# Patient Record
Sex: FEMALE | Race: OTHER | URBAN - METROPOLITAN AREA
[De-identification: names, ages, dates, MRNs, and addresses within clinical notes are randomized per-mention and may not be internally consistent; named-entity substitution may affect disease eponyms.]

---

## 2021-04-13 ENCOUNTER — INPATIENT (INPATIENT)
Facility: HOSPITAL | Age: 86
LOS: 7 days | Discharge: HOSPICE | End: 2021-04-21
Attending: INTERNAL MEDICINE | Admitting: INTERNAL MEDICINE
Payer: OTHER MISCELLANEOUS

## 2021-04-13 VITALS
SYSTOLIC BLOOD PRESSURE: 190 MMHG | DIASTOLIC BLOOD PRESSURE: 108 MMHG | HEIGHT: 62 IN | WEIGHT: 179.9 LBS | HEART RATE: 120 BPM

## 2021-04-13 LAB
ALBUMIN SERPL ELPH-MCNC: 4.2 G/DL — SIGNIFICANT CHANGE UP (ref 3.5–5.2)
ALP SERPL-CCNC: 137 U/L — HIGH (ref 30–115)
ALT FLD-CCNC: 34 U/L — SIGNIFICANT CHANGE UP (ref 0–41)
ANION GAP SERPL CALC-SCNC: 15 MMOL/L — HIGH (ref 7–14)
APTT BLD: 37.5 SEC — SIGNIFICANT CHANGE UP (ref 27–39.2)
AST SERPL-CCNC: 361 U/L — HIGH (ref 0–41)
BASE EXCESS BLDV CALC-SCNC: 0.9 MMOL/L — SIGNIFICANT CHANGE UP (ref -2–2)
BASOPHILS # BLD AUTO: 0.02 K/UL — SIGNIFICANT CHANGE UP (ref 0–0.2)
BASOPHILS NFR BLD AUTO: 0.1 % — SIGNIFICANT CHANGE UP (ref 0–1)
BILIRUB SERPL-MCNC: 0.5 MG/DL — SIGNIFICANT CHANGE UP (ref 0.2–1.2)
BLD GP AB SCN SERPL QL: SIGNIFICANT CHANGE UP
BUN SERPL-MCNC: 29 MG/DL — HIGH (ref 10–20)
CALCIUM SERPL-MCNC: 9.6 MG/DL — SIGNIFICANT CHANGE UP (ref 8.5–10.1)
CHLORIDE SERPL-SCNC: 104 MMOL/L — SIGNIFICANT CHANGE UP (ref 98–110)
CO2 SERPL-SCNC: 20 MMOL/L — SIGNIFICANT CHANGE UP (ref 17–32)
CREAT SERPL-MCNC: 0.9 MG/DL — SIGNIFICANT CHANGE UP (ref 0.7–1.5)
EOSINOPHIL # BLD AUTO: 0 K/UL — SIGNIFICANT CHANGE UP (ref 0–0.7)
EOSINOPHIL NFR BLD AUTO: 0 % — SIGNIFICANT CHANGE UP (ref 0–8)
ETHANOL SERPL-MCNC: <10 MG/DL — SIGNIFICANT CHANGE UP
GLUCOSE SERPL-MCNC: 180 MG/DL — HIGH (ref 70–99)
HCO3 BLDV-SCNC: 28 MMOL/L — SIGNIFICANT CHANGE UP (ref 22–29)
HCT VFR BLD CALC: 43.6 % — SIGNIFICANT CHANGE UP (ref 37–47)
HGB BLD-MCNC: 13.8 G/DL — SIGNIFICANT CHANGE UP (ref 12–16)
IMM GRANULOCYTES NFR BLD AUTO: 0.4 % — HIGH (ref 0.1–0.3)
INR BLD: 1.1 RATIO — SIGNIFICANT CHANGE UP (ref 0.65–1.3)
LACTATE BLDV-MCNC: 2.6 MMOL/L — HIGH (ref 0.5–1.6)
LYMPHOCYTES # BLD AUTO: 1.93 K/UL — SIGNIFICANT CHANGE UP (ref 1.2–3.4)
LYMPHOCYTES # BLD AUTO: 11.4 % — LOW (ref 20.5–51.1)
MCHC RBC-ENTMCNC: 27.7 PG — SIGNIFICANT CHANGE UP (ref 27–31)
MCHC RBC-ENTMCNC: 31.7 G/DL — LOW (ref 32–37)
MCV RBC AUTO: 87.4 FL — SIGNIFICANT CHANGE UP (ref 81–99)
MONOCYTES # BLD AUTO: 1.64 K/UL — HIGH (ref 0.1–0.6)
MONOCYTES NFR BLD AUTO: 9.7 % — HIGH (ref 1.7–9.3)
NEUTROPHILS # BLD AUTO: 13.24 K/UL — HIGH (ref 1.4–6.5)
NEUTROPHILS NFR BLD AUTO: 78.4 % — HIGH (ref 42.2–75.2)
NRBC # BLD: 0 /100 WBCS — SIGNIFICANT CHANGE UP (ref 0–0)
PCO2 BLDV: 52 MMHG — HIGH (ref 39–42)
PH BLDV: 7.33 — SIGNIFICANT CHANGE UP (ref 7.26–7.43)
PLATELET # BLD AUTO: 364 K/UL — SIGNIFICANT CHANGE UP (ref 130–400)
PO2 BLDV: 18 MMHG — LOW (ref 20–40)
POTASSIUM SERPL-MCNC: 5.4 MMOL/L — HIGH (ref 3.5–5)
POTASSIUM SERPL-SCNC: 5.4 MMOL/L — HIGH (ref 3.5–5)
PROT SERPL-MCNC: 7.3 G/DL — SIGNIFICANT CHANGE UP (ref 6–8)
PROTHROM AB SERPL-ACNC: 12.7 SEC — SIGNIFICANT CHANGE UP (ref 9.95–12.87)
RAPID RVP RESULT: SIGNIFICANT CHANGE UP
RBC # BLD: 4.99 M/UL — SIGNIFICANT CHANGE UP (ref 4.2–5.4)
RBC # FLD: 14.7 % — HIGH (ref 11.5–14.5)
SAO2 % BLDV: 21 % — SIGNIFICANT CHANGE UP
SARS-COV-2 RNA SPEC QL NAA+PROBE: SIGNIFICANT CHANGE UP
SODIUM SERPL-SCNC: 139 MMOL/L — SIGNIFICANT CHANGE UP (ref 135–146)
TROPONIN T SERPL-MCNC: 4.79 NG/ML — CRITICAL HIGH
WBC # BLD: 16.9 K/UL — HIGH (ref 4.8–10.8)
WBC # FLD AUTO: 16.9 K/UL — HIGH (ref 4.8–10.8)

## 2021-04-13 PROCEDURE — 71275 CT ANGIOGRAPHY CHEST: CPT | Mod: 26,MH

## 2021-04-13 PROCEDURE — 93010 ELECTROCARDIOGRAM REPORT: CPT

## 2021-04-13 PROCEDURE — 70498 CT ANGIOGRAPHY NECK: CPT | Mod: 26,MH

## 2021-04-13 PROCEDURE — 70496 CT ANGIOGRAPHY HEAD: CPT | Mod: 26,MH

## 2021-04-13 PROCEDURE — 70450 CT HEAD/BRAIN W/O DYE: CPT | Mod: 26,59,MH

## 2021-04-13 PROCEDURE — 99223 1ST HOSP IP/OBS HIGH 75: CPT

## 2021-04-13 PROCEDURE — 99291 CRITICAL CARE FIRST HOUR: CPT

## 2021-04-13 PROCEDURE — 74174 CTA ABD&PLVS W/CONTRAST: CPT | Mod: 26,MH

## 2021-04-13 PROCEDURE — 0042T: CPT

## 2021-04-13 RX ORDER — ACETAMINOPHEN 500 MG
975 TABLET ORAL ONCE
Refills: 0 | Status: COMPLETED | OUTPATIENT
Start: 2021-04-13 | End: 2021-04-13

## 2021-04-13 RX ORDER — ESMOLOL HCL 100MG/10ML
50 VIAL (ML) INTRAVENOUS
Qty: 2500 | Refills: 0 | Status: DISCONTINUED | OUTPATIENT
Start: 2021-04-13 | End: 2021-04-13

## 2021-04-13 RX ORDER — MORPHINE SULFATE 50 MG/1
2 CAPSULE, EXTENDED RELEASE ORAL EVERY 4 HOURS
Refills: 0 | Status: DISCONTINUED | OUTPATIENT
Start: 2021-04-13 | End: 2021-04-17

## 2021-04-13 RX ADMIN — MORPHINE SULFATE 2 MILLIGRAM(S): 50 CAPSULE, EXTENDED RELEASE ORAL at 23:18

## 2021-04-13 RX ADMIN — Medication 975 MILLIGRAM(S): at 18:30

## 2021-04-13 RX ADMIN — Medication 24.5 MICROGRAM(S)/KG/MIN: at 18:42

## 2021-04-13 NOTE — ED PROVIDER NOTE - ATTENDING CONTRIBUTION TO CARE
Patient seen with PA, not resident/fellow.    89 yo F pmh as stated in chart pw change in mental status. LKW 11 am today when she was speaking with family over the phone. Stated at that time she didn't feel well. Family checked on pt just prior to arrival in ED and noted L arm and leg paralysis and paucity of speech. Patient unable to provide further hx.     CONSTITUTIONAL: +tachypnea with mild respiratory distress.   SKIN: skin exam is warm and dry, no acute rash.  HEAD: Normocephalic; atraumatic.  EYES: PERRL, 3 mm bilateral, no nystagmus, EOM intact; conjunctiva and sclera clear.  ENT: No nasal discharge; airway clear.  NECK: Supple; non tender. + full passive ROM in all directions. No JVD  CARD: S1, S2 normal; no murmurs, gallops, or rubs. Regular rate and rhythm. + Symmetric Strong Pulses  RESP: +crackles bilaterally, +tachypnea.   ABD: soft; non-distended; non-tender. No Rebound, No Guarding, No signs of peritonitis, No CVA tenderness. No pulsatile abdominal mass. + Strong and Symmetric Pulses  EXT: Normal ROM. No clubbing, cyanosis or edema. Dp and Pt Pulses intact. Cap refill less than 3 seconds  NEURO: Intermittently answering questions and following commands. +L leg paralysis, +L arm weakness. Moving R sided extremities spontaneously but with no proximal strength.   PSYCH: Unable to assess.

## 2021-04-13 NOTE — CONSULT NOTE ADULT - SUBJECTIVE AND OBJECTIVE BOX
HPI:  89 YO F with no known PMH, and no known cardiac history presented from home with cc of 'not feeling well'. Pt. lives alone, and as per family when they called her around 4 pm today, the noticed she was not herself, and went to see her. Pt. as per family was sitting in her chair, and was noted to have left facial droop. EKG in the ER showed anteroseptal Q waves.    STEMI Code was called and I arrived and evaluated the patient in the ER. Pt. presented with left facial droop that was noticed today by the family and they brought her to the ER. CT head during stroke code showed acute right frontotemporal stroke. Pt. unable to provide any history. EKG in the ER showed sinus tachycardia with anteroseptal Q waves (unknown chronicity). After discussion with Interventionalist on call (Dr. Troncoso) STEMI code was cancelled. Pt. with markedly elevated systemic BP in the ER with SBP of 180s. Bedside echo shows mild pericardial effusion.     PAST MEDICAL & SURGICAL HISTORY      FAMILY HISTORY:  FAMILY HISTORY:      SOCIAL HISTORY:  []smoker  []Alcohol  []Drug    ALLERGIES:  No Known Allergies      MEDICATIONS:  MEDICATIONS  (STANDING):    MEDICATIONS  (PRN):      HOME MEDICATIONS:  Home Medications:      VITALS:   T(F): 100.5 (04-13 @ 17:48), Max: 100.5 (04-13 @ 17:48)  HR: 118 (04-13 @ 17:48) (108 - 120)  BP: 182/97 (04-13 @ 17:48) (182/97 - 216/138)  BP(mean): --  RR: 30 (04-13 @ 17:48) (30 - 35)  SpO2: 99% (04-13 @ 17:48) (92% - 99%)    I&O's Summary      REVIEW OF SYSTEMS:  CONSTITUTIONAL: No weakness, fevers or chills  EYES/ENT: No visual changes;  No vertigo or throat pain   NECK: No pain or stiffness  RESPIRATORY: No cough, wheezing, hemoptysis; No shortness of breath  CARDIOVASCULAR: No chest pain or palpitations  GASTROINTESTINAL: No abdominal or epigastric pain. No nausea, vomiting, or hematemesis; No diarrhea or constipation. No melena or hematochezia.  GENITOURINARY: No dysuria, frequency or hematuria  NEUROLOGICAL: No numbness or weakness  SKIN: No itching, no rashes    PHYSICAL EXAM:  NEURO: patient is awake , alert and oriented  GEN: Not in acute distress  NECK: no thyroid enlargement, no JVD  LUNGS: Clear to auscultation bilaterally   CARDIOVASCULAR: S1/S2 present, RRR , no murmurs or rubs, no carotid bruits,  + PP bilaterally  ABD: Soft, non-tender, non-distended, +BS  EXT: No HENRY  SKIN: Intact    LABS:                        13.8   16.90 )-----------( 364      ( 13 Apr 2021 17:00 )             43.6           PT/INR - ( 13 Apr 2021 17:00 )   PT: 12.70 sec;   INR: 1.10 ratio         PTT - ( 13 Apr 2021 17:00 )  PTT:37.5 sec          Troponin trend:            RADIOLOGY:  -CXR:  -TTE:  -CCTA:  -STRESS TEST:  -CATHETERIZATION:    ECG:    TELEMETRY EVENTS:   HPI:  89 YO F with no known PMH, and no known cardiac history presented from home with cc of 'not feeling well'. Pt. lives alone, and as per family when they called her around 4 pm today, the noticed she was not herself, and went to see her. Pt. as per family was sitting in her chair, and was noted to have left facial droop. EKG in the ER showed anteroseptal Q waves.    STEMI Code was called and I arrived and evaluated the patient in the ER. Pt. presented with left facial droop that was noticed today by the family and they brought her to the ER. CT head during stroke code showed acute right frontotemporal stroke. Pt. unable to provide any history. EKG in the ER showed sinus tachycardia with anteroseptal Q waves (unknown chronicity). After discussion with Interventionalist on call (Dr. Troncoso) STEMI code was cancelled. Pt. with markedly elevated systemic BP in the ER with SBP of 180s. Bedside echo shows mild pericardial effusion.     PAST MEDICAL & SURGICAL HISTORY      FAMILY HISTORY:  FAMILY HISTORY:      SOCIAL HISTORY:  []smoker  []Alcohol  []Drug    ALLERGIES:  No Known Allergies      MEDICATIONS:  MEDICATIONS  (STANDING):    MEDICATIONS  (PRN):      HOME MEDICATIONS:  Home Medications:      VITALS:   T(F): 100.5 (04-13 @ 17:48), Max: 100.5 (04-13 @ 17:48)  HR: 118 (04-13 @ 17:48) (108 - 120)  BP: 182/97 (04-13 @ 17:48) (182/97 - 216/138)  BP(mean): --  RR: 30 (04-13 @ 17:48) (30 - 35)  SpO2: 99% (04-13 @ 17:48) (92% - 99%)    I&O's Summary      REVIEW OF SYSTEMS:  unable to obtain ros, pt. confused and not following commands    PHYSICAL EXAM:  NEURO: patient is awake, AAO x1  GEN: Not in acute distress  NECK: no thyroid enlargement, no JVD  LUNGS: Clear to auscultation bilaterally   CARDIOVASCULAR: S1/S2 present, RRR , no murmurs or rubs, no carotid bruits,  + PP bilaterally  ABD: Soft, non-tender, non-distended, +BS  EXT: No HENRY  SKIN: Intact    LABS:                        13.8   16.90 )-----------( 364      ( 13 Apr 2021 17:00 )             43.6           PT/INR - ( 13 Apr 2021 17:00 )   PT: 12.70 sec;   INR: 1.10 ratio         PTT - ( 13 Apr 2021 17:00 )  PTT:37.5 sec          Troponin trend:            RADIOLOGY:  -CXR:  -TTE:  -CCTA:  -STRESS TEST:  -CATHETERIZATION:    ECG:  Sinus tachycardia, anteroseptal Q waves

## 2021-04-13 NOTE — ED PROVIDER NOTE - PHYSICAL EXAMINATION
VITALS:  I have reviewed the initial vital signs.  GENERAL: Well-developed, disheveled, elderly female.   HEENT: NC/AT. Sclera clear. +gaze deviation to the right. PERRLA. MMM.   NECK: supple w FROM.   CARDIO: tachycardic, regular rhythm, nl S1 and S2. No murmurs, rubs, or gallops. No peripheral edema. 2+ radial and pedal pulses bilaterally.  PULM: Tachypneic, subcostal retractions, coarse breath sounds to b/l bases.   MSK: No joint swelling, erythema, deformity, or ttp.  GI: Abdomen soft and non-distended. Nontender.  SKIN: Warm, dry. Capillary refill <2 seconds. No pallor. No rash.   NEURO: Somnolent, moans to voice and withdraws to pain. Spontaneously moving RLE and RUE but cannot move LUE and LLE. +left facial droop. +gaze deviation to the right.

## 2021-04-13 NOTE — CONSULT NOTE ADULT - ASSESSMENT
Assessment: 89 y/o female with no PMHx presents from home for AMS and L sided weakness, LKW 11:00am. NIH SS on arrival 17. CTH showed early signs of acute R frontoparietal/frontal infarct. No LVO on CTA, CTP showed hypoperfusion of R frontoparietal/frontal region without significant penumbra. Not a candidate for tPA or NI. EKG in ED showed anteroseptal Q waves, code STEMI called, evaluated by Cardiology and cancelled. Concern for aortic dissection, CTA abd/chest pending.    Plan:  #Neuro:  - Neurochecks q1h  - Ischemic stroke workup including lipid panel, HgbA1c, TSH, TTE, telemetry  - Start 81mg ASA, 80mg atorvastatin daily  - PT/OT when stable    #CV:  - Permissive HTN unless confirmed dissection or other contraindication, goal SBP <220/120  - CTA chest/abd to r/o dissection pending  - ACS workup pending    #Resp:  - HOB > 35 degrees  - Aspiration precautions    #Renal/Fluid/Electolytes:  - Strict I/Os  - Keep euvolemic  - Monitor lytes, replete as needed    #GI:  - Strict NPO including meds until passes swallow eval    #Heme/Onc:  - SCDs    #ID:  - Keep normothermic

## 2021-04-13 NOTE — ED ADULT NURSE REASSESSMENT NOTE - NS ED NURSE REASSESS COMMENT FT1
Patient mental status changing Dr franks at bedside. Patient now confused and decreased alertness. Patient family called to bedside. Patient appears tachypneic despite oxygen use. Arterial Line placed at bedside, esmolol drip started for blood pressure control. Safety precautions maintained with bed alarm.

## 2021-04-13 NOTE — H&P ADULT - NSHPPHYSICALEXAM_GEN_ALL_CORE
NIHSS 19     General: No acute distress.  Alert, oriented, interactive, nonfocal.    HEENT: Pupils equal, reactive to light symmetrically.    PULM: Clear to auscultation bilaterally, no significant sputum production.    CVS: Regular rate and rhythm Muffled, no murmurs, rubs, or gallops.    GI: Soft, nondistended, nontender, no masses.    EXT: No edema, nontender. Left Upper extremity weakness     SKIN: Warm and well perfused, no rashes noted.    PSYCH: AAOx0-1

## 2021-04-13 NOTE — CONSULT NOTE ADULT - SUBJECTIVE AND OBJECTIVE BOX
**STROKE CODE CONSULT NOTE**    Last known well time/Time of onset of symptoms: LKW 11:00am 4/16/21    HPI: 89y Female with no known PMHx on no medications who presents from home for AMS, left sided weakness. Per son-in-law, pt spoke with family this morning at 11:00am and told them that she didn't feel well and hadn't slept well overnight. He states that last night she did not voice any complaints to family. Family went to house, found pt slumped in chair leaning to the left and called EMS. On arrival to ED, pt was hypertensive (216/168) and tachycardic with labored breathing. Stroke code called PTA. Pt able to provide limited Hx due to clinical condition, denies HA. Initial NIHSS 17, CTH showed early signs of acute infarct of R frontal/R frontoparietal region. Not a tPA candidate as she is out of window. CTP showed area of core infarct without any surrounding salvageable tissue, not a candidate for NI. At baseline pt lives alone and is functionally independent.      T(C): 38.1 (04-13-21 @ 17:48), Max: 38.1 (04-13-21 @ 17:48)  HR: 118 (04-13-21 @ 17:48) (108 - 120)  BP: 182/97 (04-13-21 @ 17:48) (182/97 - 216/138)  RR: 30 (04-13-21 @ 17:48) (30 - 35)  SpO2: 99% (04-13-21 @ 17:48) (92% - 99%)    PAST MEDICAL & SURGICAL HISTORY:  No known PMHx    FAMILY HISTORY:  Unknown    SOCIAL HISTORY:  Lives alone    ROS: Patient unable to participate in ROS due to neurologic status    MEDICATIONS:  No home medications    Allergies    No Known Allergies    Intolerances      Vital Signs Last 24 Hrs  T(C): 38.1 (13 Apr 2021 17:48), Max: 38.1 (13 Apr 2021 17:48)  T(F): 100.5 (13 Apr 2021 17:48), Max: 100.5 (13 Apr 2021 17:48)  HR: 118 (13 Apr 2021 17:48) (108 - 120)  BP: 182/97 (13 Apr 2021 17:48) (182/97 - 216/138)  BP(mean): --  RR: 30 (13 Apr 2021 17:48) (30 - 35)  SpO2: 99% (13 Apr 2021 17:48) (92% - 99%)    Physical exam:  Constitutional: Acutely ill-appearing elderly female in moderate distress    Neurologic:  -Mental status: Awake, alert, oriented to person, place, and time. Patient speaking in 2-3 word phrases due to labored breathing, however naming intact, intermittent perseveration. Mild dysarthria  -Cranial nerves:   II: Visual fields are full to confrontation.  III, IV, VI: R gaze preference, unable to cross midline  V:  Facial sensation V1-V3 equal and intact   VII: Flattening of left nasolabial fold  Motor: RUE with antigravity movement, falls to bed. RLE able to move within the plane of the bed. LUE/LLE fall to bed immediately, no observed movement antigravity.  Sensation: Intact to light touch bilaterally. No neglect or extinction on double simultaneous testing.  Coordination: No dysmetria on finger-to-nose and heel-to-shin bilaterally      NIHSS: 17 ASPECT Score: 8     Fingerstick Blood Glucose: CAPILLARY BLOOD GLUCOSE  159 (13 Apr 2021 17:50)      POCT Blood Glucose.: 159 mg/dL (13 Apr 2021 17:02)    LABS:                        13.8   16.90 )-----------( 364      ( 13 Apr 2021 17:00 )             43.6           PT/INR - ( 13 Apr 2021 17:00 )   PT: 12.70 sec;   INR: 1.10 ratio         PTT - ( 13 Apr 2021 17:00 )  PTT:37.5 sec          RADIOLOGY & ADDITIONAL STUDIES:      -----------------------------------------------------------------------------------------------------------------  IV-tPA (Y/N):   N                             Reason IV-tPA not given: Not within tPA window  **STROKE CODE CONSULT NOTE**    Last known well time/Time of onset of symptoms: LKW 11:00am 4/16/21    HPI: 89y Female with no known PMHx on no medications who presents from home for AMS, left sided weakness. Per son-in-law, pt spoke with family this morning at 11:00am and told them that she didn't feel well and hadn't slept well overnight, was answering questions appropriately during phone call. He states that last night she did not voice any complaints to family. Family went to house at around 4pm, found pt slumped in chair leaning to the left and called EMS. On arrival to ED, pt was hypertensive (216/168) and tachycardic with labored breathing. Stroke code called PTA. Pt able to provide limited Hx due to clinical condition, denies HA. Initial NIHSS 17, CTH showed early signs of acute infarct of R frontal/R frontoparietal region. Not a tPA candidate as she is out of window. CTP showed area of core infarct without any surrounding salvageable tissue, not a candidate for NI. At baseline pt lives alone and is functionally independent.      T(C): 38.1 (04-13-21 @ 17:48), Max: 38.1 (04-13-21 @ 17:48)  HR: 118 (04-13-21 @ 17:48) (108 - 120)  BP: 182/97 (04-13-21 @ 17:48) (182/97 - 216/138)  RR: 30 (04-13-21 @ 17:48) (30 - 35)  SpO2: 99% (04-13-21 @ 17:48) (92% - 99%)    PAST MEDICAL & SURGICAL HISTORY:  No known PMHx    FAMILY HISTORY:  Unknown    SOCIAL HISTORY:  Lives alone    ROS: Patient unable to participate in ROS due to neurologic status    MEDICATIONS:  No home medications    Allergies    No Known Allergies    Intolerances      Vital Signs Last 24 Hrs  T(C): 38.1 (13 Apr 2021 17:48), Max: 38.1 (13 Apr 2021 17:48)  T(F): 100.5 (13 Apr 2021 17:48), Max: 100.5 (13 Apr 2021 17:48)  HR: 118 (13 Apr 2021 17:48) (108 - 120)  BP: 182/97 (13 Apr 2021 17:48) (182/97 - 216/138)  BP(mean): --  RR: 30 (13 Apr 2021 17:48) (30 - 35)  SpO2: 99% (13 Apr 2021 17:48) (92% - 99%)    Physical exam:  Constitutional: Acutely ill-appearing elderly female in moderate distress    Neurologic:  -Mental status: Awake, alert, oriented to person, place, and time. Patient speaking in 2-3 word phrases due to labored breathing, however naming intact, intermittent perseveration. Mild dysarthria  -Cranial nerves:   II: Visual fields are full to confrontation.  III, IV, VI: R gaze preference, unable to cross midline  V:  Facial sensation V1-V3 equal and intact   VII: Flattening of left nasolabial fold  Motor: RUE with antigravity movement, falls to bed. RLE able to move within the plane of the bed. LUE/LLE fall to bed immediately, no observed movement antigravity.  Sensation: Intact to light touch bilaterally. No neglect or extinction on double simultaneous testing.  Coordination: No dysmetria on finger-to-nose and heel-to-shin bilaterally      NIHSS: 17 ASPECT Score: 8     Fingerstick Blood Glucose: CAPILLARY BLOOD GLUCOSE  159 (13 Apr 2021 17:50)      POCT Blood Glucose.: 159 mg/dL (13 Apr 2021 17:02)    LABS:                        13.8   16.90 )-----------( 364      ( 13 Apr 2021 17:00 )             43.6           PT/INR - ( 13 Apr 2021 17:00 )   PT: 12.70 sec;   INR: 1.10 ratio         PTT - ( 13 Apr 2021 17:00 )  PTT:37.5 sec          RADIOLOGY & ADDITIONAL STUDIES:      -----------------------------------------------------------------------------------------------------------------  IV-tPA (Y/N):   N                             Reason IV-tPA not given: Not within tPA window

## 2021-04-13 NOTE — ED PROVIDER NOTE - CLINICAL SUMMARY MEDICAL DECISION MAKING FREE TEXT BOX
I personally evaluated the patient. I reviewed the Resident’s or Physician Assistant’s note (as assigned above), and agree with the findings and plan except as documented in my note. Patient evaluated for L sided weakness, change in mental status. Stroke code prenotification called prior to patient arrival. Ct head, CT perfusion, labs, ekg, cxr performed in ED. Not an interventional or tPA candidate per neurology due to unknown onset of sx. STEMI Code called upon initial evaluation of EKG. Per cardiology, STEMI code cancelled not a cath lab candidate. +pericardial effusion noted on bedside sono. Patient hypertensive 200's over 100's consistently, arterial line placed for monitoring and started on esmolol drip. Vascular consulted and CT dissection study performed. Per vascular and radiology prelim reads, no acute dissection identified on CT scan. Discussed with patient's family regarding goals of care. Per daughter (health care proxy), son in law and patient's other children via phone, agreed to make patient comfort care only. MOLST form completed making patient DNR/DNI and comfort care with no invasive intervention due to poor prognosis and patient and family wishes. Discussed with ICU team for possible ICU admission. Not an icu candidate at this time, approved for SDU. Patient admitted to SDU for further evaluation and treatment.

## 2021-04-13 NOTE — CHART NOTE - NSCHARTNOTEFT_GEN_A_CORE
STEMI Code was called and I arrived and evaluated the patient in the ER. Pt. presented with left facial droop that was noticed today by the family and they brought her to the ER. CT head during stroke code showed acute right frontotemporal stroke. Pt. unable to provide any history. EKG in the ER showed sinus tachycardia with anteroseptal Q waves (unknown chronicity). After discussion with Interventionalist on call (Dr. Troncoso) STEMI code was cancelled. Pt. with markedly elevated systemic BP and CVA, rule out aortic dissection, rule out ACS. Plan of care discussed with ED team.

## 2021-04-13 NOTE — H&P ADULT - ATTENDING COMMENTS
HPI:   89y Female with no known PMHx on no medications who presents from home for AMS, left sided weakness. Per son-in-law, pt spoke with family this morning at 11:00am and told them that she didn't feel well and hadn't slept well overnight, was answering questions appropriately during phone call. He states that last night she did not voice any complaints to family. Family went to house at around 4pm, found pt slumped in chair leaning to the left and called EMS. On arrival to ED, pt was hypertensive (216/168) and tachycardic with labored breathing. Stroke code called PTA. Pt able to provide limited Hx due to clinical condition, denies HA. Initial NIHSS 17, CTH showed early signs of acute infarct of R frontal/R frontoparietal region. Not a tPA candidate as she is out of window. CTP showed area of core infarct without any surrounding salvageable tissue, not a candidate for NI. At baseline pt lives alone and is functionally independent.        ED: STEMI Code was called  evaluated the patient in the ER by cardio fellow. EKG in the ER showed sinus tachycardia with anteroseptal Q waves (unknown chronicity). After discussion with Interventionalist on call (Dr. Troncoso) STEMI code was cancelled. Pt. with markedly elevated systemic BP in the ER with SBP of 180s. Bedside echo shows mild pericardial effusion. Hypertensive Emergency improved on Esmolol which is now off.  Dissection was ruled on CTA.     Family  Discussion Proxy  Yessica Guerra  and other family members  made patient comfort Care  Only with primary Treatment Goal  being  Comfort. Patient very Confused at bedside not answering  commands.        (13 Apr 2021 21:22)    REVIEW OF SYSTEMS: see cc/HPI   CONSTITUTIONAL: (+) weakness, fevers or chills  EYES/ENT: No visual changes;  No vertigo or throat pain   NECK: No pain or stiffness  RESPIRATORY: No cough, wheezing, hemoptysis; No shortness of breath  CARDIOVASCULAR: No chest pain or palpitations  GASTROINTESTINAL: No abdominal or epigastric pain. No nausea, vomiting, or hematemesis; No diarrhea or constipation. No melena or hematochezia.  GENITOURINARY: No dysuria, frequency or hematuria  NEUROLOGICAL: No numbness (+) focal weakness, lethargy / altered, declining mental status  SKIN: No itching, rashes    Physical Exam:  General: altered mental status/lethargy   Neurology: A&Ox3, nonfocal, follows commands  Eyes: PERRLA/ EOMI  ENT/Neck: Neck supple, trachea midline, No JVD  Respiratory: CTA B/L, No wheezing, rales, rhonchi  CV: Normal rate regular rhythm, S1S2, no murmurs, rubs or gallops  Abdominal: Soft, NT, ND +BS,   Extremities: No edema, + peripheral pulses  Skin: No Rashes, Hematoma, Ecchymosis  Incisions:   Tubes:    A/p  Acute CVA w/ significant deficit and family requesting comfort measures - no A-line , No drips ( was on esmolol), no Rx   STEMI / Hypertensive emergency   Pericardial effusion - large   - Palliative care   -admit to floor   -NPO while lethargic/dysphagia   -swallow eval if mental status improves  -DNR/ DNI  Discussed care plan with daughter and son-in-law at the bedside and sibling all in agreement - COMFORT MEASURES ONLY HPI:   89y Female with no known PMHx on no medications who presents from home for AMS, left sided weakness. Per son-in-law, pt spoke with family this morning at 11:00am and told them that she didn't feel well and hadn't slept well overnight, was answering questions appropriately during phone call. He states that last night she did not voice any complaints to family. Family went to house at around 4pm, found pt slumped in chair leaning to the left and called EMS. On arrival to ED, pt was hypertensive (216/168) and tachycardic with labored breathing. Stroke code called PTA. Pt able to provide limited Hx due to clinical condition, denies HA. Initial NIHSS 17, CTH showed early signs of acute infarct of R frontal/R frontoparietal region. Not a tPA candidate as she is out of window. CTP showed area of core infarct without any surrounding salvageable tissue, not a candidate for NI. At baseline pt lives alone and is functionally independent.        ED: STEMI Code was called  evaluated the patient in the ER by cardio fellow. EKG in the ER showed sinus tachycardia with anteroseptal Q waves (unknown chronicity). After discussion with Interventionalist on call (Dr. Troncoso) STEMI code was cancelled. Pt. with markedly elevated systemic BP in the ER with SBP of 180s. Bedside echo shows mild pericardial effusion. Hypertensive Emergency improved on Esmolol which is now off.  Dissection was ruled on CTA.     Family  Discussion Proxy  Yessica Guerra  and other family members  made patient comfort Care  Only with primary Treatment Goal  being  Comfort. Patient very Confused at bedside not answering  commands.        (13 Apr 2021 21:22)    REVIEW OF SYSTEMS: see cc/HPI   CONSTITUTIONAL: (+) weakness, fevers or chills  EYES/ENT: No visual changes;  No vertigo or throat pain   NECK: No pain or stiffness  RESPIRATORY: No cough, wheezing, hemoptysis; No shortness of breath  CARDIOVASCULAR: No chest pain or palpitations  GASTROINTESTINAL: No abdominal or epigastric pain. No nausea, vomiting, or hematemesis; No diarrhea or constipation. No melena or hematochezia.  GENITOURINARY: No dysuria, frequency or hematuria  NEUROLOGICAL: No numbness (+) focal weakness, lethargy / altered, declining mental status  SKIN: No itching, rashes    Physical Exam:  General: altered mental status/lethargy   Neurology: A&Ox3, nonfocal, follows commands  Eyes: PERRLA/ EOMI  ENT/Neck: Neck supple, trachea midline, No JVD  Respiratory: CTA B/L, No wheezing, rales, rhonchi  CV: Normal rate regular rhythm, S1S2, no murmurs, rubs or gallops  Abdominal: Soft, NT, ND +BS,   Extremities: No edema, + peripheral pulses  Skin: No Rashes, Hematoma, Ecchymosis  Incisions:   Tubes:    A/p  Acute CVA w/ significant deficit and family requesting comfort measures - no A-line , No drips ( was on esmolol), no Rx   STEMI / Hypertensive emergency   Pericardial effusion - large   - Palliative care   -admit to floor   -NPO while lethargic/dysphagia   -swallow eval if mental status improves  -DNR/ DNI  Discussed care plan with daughter and son-in-law at the bedside and sibling all in agreement - COMFORT MEASURES ONLY.  Patient seen/examined and family discussion held 04/13/21  Note amended 04/14/21

## 2021-04-13 NOTE — H&P ADULT - HISTORY OF PRESENT ILLNESS
89y Female with no known PMHx on no medications who presents from home for AMS, left sided weakness. Per son-in-law, pt spoke with family this morning at 11:00am and told them that she didn't feel well and hadn't slept well overnight, was answering questions appropriately during phone call. He states that last night she did not voice any complaints to family. Family went to house at around 4pm, found pt slumped in chair leaning to the left and called EMS. On arrival to ED, pt was hypertensive (216/168) and tachycardic with labored breathing. Stroke code called PTA. Pt able to provide limited Hx due to clinical condition, denies HA. Initial NIHSS 17, CTH showed early signs of acute infarct of R frontal/R frontoparietal region. Not a tPA candidate as she is out of window. CTP showed area of core infarct without any surrounding salvageable tissue, not a candidate for NI. At baseline pt lives alone and is functionally independent.        ED: STEMI Code was called  evaluated the patient in the ER by cardio fellow. EKG in the ER showed sinus tachycardia with anteroseptal Q waves (unknown chronicity). After discussion with Interventionalist on call (Dr. Troncoso) STEMI code was cancelled. Pt. with markedly elevated systemic BP in the ER with SBP of 180s. Bedside echo shows mild pericardial effusion. Hypertensive Emergency improved on Esmolol which is now off.  Dissection was ruled on CTA.     Family  Discussion Proxy  Yessica Guerra  and other family members  made patient comfort Care  Only with primary Treatment Goal  being  Comfort. Patient very Confused at bedside not answering  commands.

## 2021-04-13 NOTE — H&P ADULT - ASSESSMENT
89y Female with no known PMHx on no medications who presents from home for AMS, left sided weakness. Per son-in-law, pt spoke with family this morning at 11:00am and told them that she didn't feel well and hadn't slept well overnight, was answering questions appropriately during phone call. He states that last night she did not voice any complaints to family. Family went to house at around 4pm, found pt slumped in chair leaning to the left and called EMS. On arrival to ED, pt was hypertensive (216/168) and tachycardic with labored breathing. Stroke code called PTA. Pt able to provide limited Hx due to clinical condition, denies HA. Initial NIHSS 17, CTH showed early signs of acute infarct of R frontal/R frontoparietal region. Not a tPA candidate as she is out of window. CTP showed area of core infarct without any surrounding salvageable tissue, not a candidate for NI. At baseline pt lives alone and is functionally independent.        ED: STEMI Code was called  evaluated the patient in the ER by cardio fellow. EKG in the ER showed sinus tachycardia with anteroseptal Q waves (unknown chronicity). After discussion with Interventionalist on call (Dr. Troncoso) STEMI code was cancelled. Pt. with markedly elevated systemic BP in the ER with SBP of 180s. Bedside echo shows mild pericardial effusion. Hypertensive Emergency improved on Esmolol which is now off.  Dissection was ruled on CTA.     Family  Discussion Proxy  Yessica Guerra  and other family members  made patient comfort Care  Only with primary Treatment Goal  being  Comfort. Patient very Confused at bedside not answering  commands.     IMPRESSION  Acute  CVA   NSTEMI  Large Pericardial Effusion  B/l Pericardial effusions   COMFORT CARE ONLY     Plan  For now   Speech and swallow tomorrow  NPO for now   Morphine  2mg PRN   Oxygen therapy as per family   No lines, Vitals, no Medication other than above   No invasive procedures  If Patient improves  Please inform Family   DownGrade to medicine floor   DNR/DNI

## 2021-04-13 NOTE — ED PROVIDER NOTE - OBJECTIVE STATEMENT
89 year old female w no known pmhx, independent with ADLs, lives at home alone presents to the ED as a pre-notification stroke code. Per EMS, patient was last in contact with family around 11 AM today on the phone, stated she wasn't feeling well. They checked on her a few hours later and noted her to have lost function of her left arm and leg, and was not speaking normally. Pt also found to be tachypneic and placed on NRB by EMS.

## 2021-04-13 NOTE — CONSULT NOTE ADULT - ASSESSMENT
89 YO F with no known PMH, and no known cardiac history presented from home with cc of 'not feeling well', found to have acute frontotemporal stroke on CT head. STEMI Code was called for EKG showing anteroseptal Q waves with 1 mm ST elevation in V1, V2.    Impression:    Acute ischemic CVA  HTN urgency - rule out aortic dissection  STEMI Code / small pericardial effusion on bedside echo    Recommend:    - Obtain repeat EKG, and serial cardiac enzymes  - Check 2d echo  - Monitor and control BP  - Treatment of acute CVA as per neurology  - Start ASA 81mg, and high intensity statin if no dissection and cleared by Neurology  - Will follow

## 2021-04-13 NOTE — STROKE CODE NOTE - NSSTROKETPAEXCLABS_HEADCT
Head CT suggesting an established acute cerebral infarction as evidenced by cayden hypodensity, regardless of size

## 2021-04-13 NOTE — ED ADULT NURSE NOTE - OBJECTIVE STATEMENT
89 y/o female brought in for stoke symptoms. patient lkw was 1130 am was speaking to daughter on the phone patient lives home alone and is independent. patient on arrival has slurred speech, right gaze deviation, and weakness to left side. stroke code called. patient also appears tachypneic and labored breathing, o2 placed on patient.

## 2021-04-13 NOTE — ED PROVIDER NOTE - PROGRESS NOTE DETAILS
NIURKA: patient called as stroke code as a pre-notification, upon arrival patient responsive to voice only, unable to move left arm or left leg with gaze deviation to the right. taken to CT with neuro team, NIURKA: patient called as stroke code as a pre-notification, upon arrival patient responsive to voice only, unable to move left arm or left leg with gaze deviation to the right. taken to CT with neuro team, no bleed on non-con however patient has secondary signs of CVA. Neuro states patient is not a tpa candidate.   Upon arrival back to ED after CT, ekg shows ?STEMI, bedside cardiac ultrasound shows large pericardial effusion. Concern for aortic dissection given clinical picture will add on dissection study. NIURKA: family is opting for no intubation at this time, will make further decisions on goals of care based off pending dissection study reads. NIURKA: patient no longer responsive, BP now 70s/40s, discussed prognosis with family, MOLST order signed, patient made DNR/DNI with comfort measures only. NIURKA: patient ran by ICU for admission, approved for stepdown unit. Dr. Renee. NIURKA: patient ran by ICU for admission, approved for stepdown unit. Dr. Renee and MAR are of admission.

## 2021-04-14 DIAGNOSIS — Z51.5 ENCOUNTER FOR PALLIATIVE CARE: ICD-10-CM

## 2021-04-14 DIAGNOSIS — I50.9 HEART FAILURE, UNSPECIFIED: ICD-10-CM

## 2021-04-14 DIAGNOSIS — R13.10 DYSPHAGIA, UNSPECIFIED: ICD-10-CM

## 2021-04-14 DIAGNOSIS — I63.9 CEREBRAL INFARCTION, UNSPECIFIED: ICD-10-CM

## 2021-04-14 LAB
A1C WITH ESTIMATED AVERAGE GLUCOSE RESULT: 6.2 % — HIGH (ref 4–5.6)
ALBUMIN SERPL ELPH-MCNC: 3.6 G/DL — SIGNIFICANT CHANGE UP (ref 3.5–5.2)
ALP SERPL-CCNC: 125 U/L — HIGH (ref 30–115)
ALT FLD-CCNC: 40 U/L — SIGNIFICANT CHANGE UP (ref 0–41)
ANION GAP SERPL CALC-SCNC: 16 MMOL/L — HIGH (ref 7–14)
AST SERPL-CCNC: 201 U/L — HIGH (ref 0–41)
BASOPHILS # BLD AUTO: 0.02 K/UL — SIGNIFICANT CHANGE UP (ref 0–0.2)
BASOPHILS NFR BLD AUTO: 0.2 % — SIGNIFICANT CHANGE UP (ref 0–1)
BILIRUB SERPL-MCNC: 0.5 MG/DL — SIGNIFICANT CHANGE UP (ref 0.2–1.2)
BUN SERPL-MCNC: 25 MG/DL — HIGH (ref 10–20)
CALCIUM SERPL-MCNC: 9.1 MG/DL — SIGNIFICANT CHANGE UP (ref 8.5–10.1)
CHLORIDE SERPL-SCNC: 106 MMOL/L — SIGNIFICANT CHANGE UP (ref 98–110)
CHOLEST SERPL-MCNC: 169 MG/DL — SIGNIFICANT CHANGE UP
CO2 SERPL-SCNC: 18 MMOL/L — SIGNIFICANT CHANGE UP (ref 17–32)
COVID-19 SPIKE DOMAIN AB INTERP: NEGATIVE — SIGNIFICANT CHANGE UP
COVID-19 SPIKE DOMAIN ANTIBODY RESULT: 0.4 U/ML — SIGNIFICANT CHANGE UP
CREAT SERPL-MCNC: 0.9 MG/DL — SIGNIFICANT CHANGE UP (ref 0.7–1.5)
EOSINOPHIL # BLD AUTO: 0.02 K/UL — SIGNIFICANT CHANGE UP (ref 0–0.7)
EOSINOPHIL NFR BLD AUTO: 0.2 % — SIGNIFICANT CHANGE UP (ref 0–8)
ESTIMATED AVERAGE GLUCOSE: 131 MG/DL — HIGH (ref 68–114)
GLUCOSE SERPL-MCNC: 136 MG/DL — HIGH (ref 70–99)
HCT VFR BLD CALC: 40.2 % — SIGNIFICANT CHANGE UP (ref 37–47)
HDLC SERPL-MCNC: 61 MG/DL — SIGNIFICANT CHANGE UP
HGB BLD-MCNC: 12.4 G/DL — SIGNIFICANT CHANGE UP (ref 12–16)
IMM GRANULOCYTES NFR BLD AUTO: 0.3 % — SIGNIFICANT CHANGE UP (ref 0.1–0.3)
LIPID PNL WITH DIRECT LDL SERPL: 101 MG/DL — HIGH
LYMPHOCYTES # BLD AUTO: 0.98 K/UL — LOW (ref 1.2–3.4)
LYMPHOCYTES # BLD AUTO: 9.2 % — LOW (ref 20.5–51.1)
MAGNESIUM SERPL-MCNC: 2.3 MG/DL — SIGNIFICANT CHANGE UP (ref 1.8–2.4)
MCHC RBC-ENTMCNC: 27.7 PG — SIGNIFICANT CHANGE UP (ref 27–31)
MCHC RBC-ENTMCNC: 30.8 G/DL — LOW (ref 32–37)
MCV RBC AUTO: 89.9 FL — SIGNIFICANT CHANGE UP (ref 81–99)
MONOCYTES # BLD AUTO: 1.24 K/UL — HIGH (ref 0.1–0.6)
MONOCYTES NFR BLD AUTO: 11.7 % — HIGH (ref 1.7–9.3)
NEUTROPHILS # BLD AUTO: 8.31 K/UL — HIGH (ref 1.4–6.5)
NEUTROPHILS NFR BLD AUTO: 78.4 % — HIGH (ref 42.2–75.2)
NON HDL CHOLESTEROL: 108 MG/DL — SIGNIFICANT CHANGE UP
NRBC # BLD: 0 /100 WBCS — SIGNIFICANT CHANGE UP (ref 0–0)
PLATELET # BLD AUTO: 262 K/UL — SIGNIFICANT CHANGE UP (ref 130–400)
POTASSIUM SERPL-MCNC: 4.6 MMOL/L — SIGNIFICANT CHANGE UP (ref 3.5–5)
POTASSIUM SERPL-SCNC: 4.6 MMOL/L — SIGNIFICANT CHANGE UP (ref 3.5–5)
PROT SERPL-MCNC: 6.3 G/DL — SIGNIFICANT CHANGE UP (ref 6–8)
RBC # BLD: 4.47 M/UL — SIGNIFICANT CHANGE UP (ref 4.2–5.4)
RBC # FLD: 14.8 % — HIGH (ref 11.5–14.5)
SARS-COV-2 IGG+IGM SERPL QL IA: 0.4 U/ML — SIGNIFICANT CHANGE UP
SARS-COV-2 IGG+IGM SERPL QL IA: NEGATIVE — SIGNIFICANT CHANGE UP
SODIUM SERPL-SCNC: 140 MMOL/L — SIGNIFICANT CHANGE UP (ref 135–146)
TRIGL SERPL-MCNC: 77 MG/DL — SIGNIFICANT CHANGE UP
WBC # BLD: 10.6 K/UL — SIGNIFICANT CHANGE UP (ref 4.8–10.8)
WBC # FLD AUTO: 10.6 K/UL — SIGNIFICANT CHANGE UP (ref 4.8–10.8)

## 2021-04-14 PROCEDURE — 99497 ADVNCD CARE PLAN 30 MIN: CPT | Mod: 25

## 2021-04-14 PROCEDURE — 99223 1ST HOSP IP/OBS HIGH 75: CPT

## 2021-04-14 PROCEDURE — 99233 SBSQ HOSP IP/OBS HIGH 50: CPT

## 2021-04-14 RX ORDER — ATORVASTATIN CALCIUM 80 MG/1
80 TABLET, FILM COATED ORAL AT BEDTIME
Refills: 0 | Status: DISCONTINUED | OUTPATIENT
Start: 2021-04-14 | End: 2021-04-21

## 2021-04-14 RX ORDER — ASPIRIN/CALCIUM CARB/MAGNESIUM 324 MG
81 TABLET ORAL ONCE
Refills: 0 | Status: COMPLETED | OUTPATIENT
Start: 2021-04-14 | End: 2021-04-14

## 2021-04-14 RX ADMIN — ATORVASTATIN CALCIUM 80 MILLIGRAM(S): 80 TABLET, FILM COATED ORAL at 21:52

## 2021-04-14 RX ADMIN — Medication 81 MILLIGRAM(S): at 16:18

## 2021-04-14 NOTE — CHART NOTE - NSCHARTNOTEFT_GEN_A_CORE
MICU Transfer Note    Transfer from: CCU  Transfer to:  (  ) Medicine    ( x ) Telemetry    (  ) RCU    (  ) Palliative    (  ) Stroke Unit    (  ) _______________    MEDICATIONS:  STANDING MEDICATIONS  atorvastatin 80 milliGRAM(s) Oral at bedtime    PRN MEDICATIONS  morphine  - Injectable 2 milliGRAM(s) IV Push every 4 hours PRN      VITAL SIGNS: Last 24 Hours  T(C): 37.6 (13 Apr 2021 20:41), Max: 38.1 (13 Apr 2021 17:48)  T(F): 99.6 (13 Apr 2021 20:41), Max: 100.5 (13 Apr 2021 17:48)  HR: 99 (13 Apr 2021 20:41) (81 - 120)  BP: 122/98 (13 Apr 2021 19:13) (119/84 - 216/138)  BP(mean): --  RR: 26 (13 Apr 2021 20:41) (24 - 35)  SpO2: 94% (13 Apr 2021 20:41) (92% - 100%)    LABS:                        13.8   16.90 )-----------( 364      ( 13 Apr 2021 17:00 )             43.6     04-13    139  |  104  |  29<H>  ----------------------------<  180<H>  5.4<H>   |  20  |  0.9    Ca    9.6      13 Apr 2021 17:00    TPro  7.3  /  Alb  4.2  /  TBili  0.5  /  DBili  x   /  AST  361<H>  /  ALT  34  /  AlkPhos  137<H>  04-13    PT/INR - ( 13 Apr 2021 17:00 )   PT: 12.70 sec;   INR: 1.10 ratio         PTT - ( 13 Apr 2021 17:00 )  PTT:37.5 sec        CARDIAC MARKERS ( 13 Apr 2021 17:00 )  x     / 4.79 ng/mL / x     / x     / x          RADIOLOGY:    Medicine COURSE:  89y Female with no known PMHx on no medications who presents from home for AMS, left sided weakness. Per son-in-law, pt spoke with family this morning at 11:00am and told them that she didn't feel well and hadn't slept well overnight, was answering questions appropriately during phone call. He states that last night she did not voice any complaints to family. Family went to house at around 4pm, found pt slumped in chair leaning to the left and called EMS. On arrival to ED, pt was hypertensive (216/168) and tachycardic with labored breathing. Stroke code called PTA. Pt able to provide limited Hx due to clinical condition, denies HA. Initial NIHSS 17, CTH showed early signs of acute infarct of R frontal/R frontoparietal region. Not a tPA candidate as she is out of window. CTP showed area of core infarct without any surrounding salvageable tissue, not a candidate for NI. At baseline pt lives alone and is functionally independent.    ED: STEMI Code was called  evaluated the patient in the ER by cardio fellow. EKG in the ER showed sinus tachycardia with anteroseptal Q waves (unknown chronicity). After discussion with Interventionalist on call (Dr. Troncoso) STEMI code was cancelled. Pt. with markedly elevated systemic BP in the ER with SBP of 180s. Bedside echo showed pericardial effusion. Hypertensive Emergency improved on Esmolol which is now off.  Dissection was ruled on CTA.     Family  Discussion Proxy  Yessica Guerra  and other family members  made patient comfort Care  Only with primary Treatment Goal  being  Comfort. Patient very Confused at bedside not answering  commands. Patient appeared approved this AM, family rescinded comfort care and would like medical management. Neurology informed, spoke to neuro RALPH ASHTON with q4h neurochecks and transfer to telemetry.     ASSESSMENT & PLAN:   # Acute  CVA   - Non-con CTH (4/14) showed loss of gray-white differentiation with hypodensity in the right frontoparietal region (6/21-24) as well as anteriorly in the right frontal region (5/22-23) consistent with areas of acute infarction.  - CT Perfusion (4/14) showed there are areas of hypoperfusion in the right frontal and right posterior parietal region with a total volume of hypoperfusion of 17 mL, with core infarct of 11 ml in the right frontal region. The penumbra volume (mismatch volume) is 6 mL for the right posterior parietal region.  - Patient was initially made comfort care in the ED, this AM neurological improvement was noted by family, palliative care had a conversation with the family and they would like to pursue medical management. They are ok with labs. If worsening they will revert to comfort care.   - Neurology made aware of patients change in medical status, will be upgraded to stroke unit or telemetry.   - was not a candidate for tPA or NI on admission  - start atorvastatin 80mg   - Permissive HTN.   - Patient has no known medical conditions, does not see physicians, will obtain lipid profile, Hba1c, TSH with T4 reflex, TTE with bubble.   - S+S evaluated the patient ok for Dysphagia 1 with nectar thick. Follow up S+S recs   - Follow up Neurology recs, especially about need for asa  - will need PT/OT     # NSTEMI  # Large Pericardial Effusion   # B/l Pericardial effusion   - Trop 4.79 on admission   - Patient is not a candidate for intervention as per cardiology   - Follow up CXR   - Follow up TTE   - cardiology following     # Diet: Dysphagia 1 nectar thick   # GI ppx: not indicated   # DVT ppx: hold off for now  # Code status: DNR/DNI, if worsening please call family.   # Dispo: acute.         For Follow-Up:  - TTE   - Neurology recs   - Lipid panel, TSH, HbA1c. MICU Transfer Note    Transfer from: CCU  Transfer to:  (  ) Medicine    ( x ) Telemetry    (  ) RCU    (  ) Palliative    (  ) Stroke Unit    (  ) _______________    MEDICATIONS:  STANDING MEDICATIONS  atorvastatin 80 milliGRAM(s) Oral at bedtime    PRN MEDICATIONS  morphine  - Injectable 2 milliGRAM(s) IV Push every 4 hours PRN      VITAL SIGNS: Last 24 Hours  T(C): 37.6 (13 Apr 2021 20:41), Max: 38.1 (13 Apr 2021 17:48)  T(F): 99.6 (13 Apr 2021 20:41), Max: 100.5 (13 Apr 2021 17:48)  HR: 99 (13 Apr 2021 20:41) (81 - 120)  BP: 122/98 (13 Apr 2021 19:13) (119/84 - 216/138)  BP(mean): --  RR: 26 (13 Apr 2021 20:41) (24 - 35)  SpO2: 94% (13 Apr 2021 20:41) (92% - 100%)    LABS:                        13.8   16.90 )-----------( 364      ( 13 Apr 2021 17:00 )             43.6     04-13    139  |  104  |  29<H>  ----------------------------<  180<H>  5.4<H>   |  20  |  0.9    Ca    9.6      13 Apr 2021 17:00    TPro  7.3  /  Alb  4.2  /  TBili  0.5  /  DBili  x   /  AST  361<H>  /  ALT  34  /  AlkPhos  137<H>  04-13    PT/INR - ( 13 Apr 2021 17:00 )   PT: 12.70 sec;   INR: 1.10 ratio         PTT - ( 13 Apr 2021 17:00 )  PTT:37.5 sec        CARDIAC MARKERS ( 13 Apr 2021 17:00 )  x     / 4.79 ng/mL / x     / x     / x          RADIOLOGY:    Medicine COURSE:  89y Female with no known PMHx on no medications who presents from home for AMS, left sided weakness. Per son-in-law, pt spoke with family this morning at 11:00am and told them that she didn't feel well and hadn't slept well overnight, was answering questions appropriately during phone call. He states that last night she did not voice any complaints to family. Family went to house at around 4pm, found pt slumped in chair leaning to the left and called EMS. On arrival to ED, pt was hypertensive (216/168) and tachycardic with labored breathing. Stroke code called PTA. Pt able to provide limited Hx due to clinical condition, denies HA. Initial NIHSS 17, CTH showed early signs of acute infarct of R frontal/R frontoparietal region. Not a tPA candidate as she is out of window. CTP showed area of core infarct without any surrounding salvageable tissue, not a candidate for NI. At baseline pt lives alone and is functionally independent.    ED: STEMI Code was called  evaluated the patient in the ER by cardio fellow. EKG in the ER showed sinus tachycardia with anteroseptal Q waves (unknown chronicity). After discussion with Interventionalist on call (Dr. Troncoso) STEMI code was cancelled. Pt. with markedly elevated systemic BP in the ER with SBP of 180s. Bedside echo showed pericardial effusion. Hypertensive Emergency improved on Esmolol which is now off.  Dissection was ruled on CTA.     Family  Discussion Proxy  Yessica Guerra  and other family members  made patient comfort Care  Only with primary Treatment Goal  being  Comfort. Patient was very confused at bedside not answering commands. Patient appeared approved this AM, now follows commands, answers appropriately, and moves all extremities, L<R. Family rescinded comfort care and would like medical management, if worsening please call family. Neurology informed, spoke to neuro PA, OK with transfer to telemetry.     ASSESSMENT & PLAN:   # Acute  CVA   - Non-con CTH (4/14) showed loss of gray-white differentiation with hypodensity in the right frontoparietal region (6/21-24) as well as anteriorly in the right frontal region (5/22-23) consistent with areas of acute infarction.  - CT Perfusion (4/14) showed there are areas of hypoperfusion in the right frontal and right posterior parietal region with a total volume of hypoperfusion of 17 mL, with core infarct of 11 ml in the right frontal region. The penumbra volume (mismatch volume) is 6 mL for the right posterior parietal region.  - Patient was initially made comfort care in the ED, this AM neurological improvement was noted by family, palliative care had a conversation with the family and they would like to pursue medical management. They are ok with labs. If worsening they will revert to comfort care.   - Neurology made aware of patients change in medical status, will be upgraded to stroke unit or telemetry.   - was not a candidate for tPA or NI on admission  - start atorvastatin 80mg   - Permissive HTN.   - Patient has no known medical conditions, does not see physicians, will obtain lipid profile, Hba1c, TSH with T4 reflex, TTE with bubble.   - S+S evaluated the patient ok for Dysphagia 1 with nectar thick. Follow up S+S recs   - repeat CTH in the AM   - Follow up Neurology recs, especially about need for asa  - will need PT/OT     # NSTEMI  # Large Pericardial Effusion   # B/l Pericardial effusion   - Trop 4.79 on admission   - Patient is not a candidate for intervention as per cardiology   - Follow up CXR   - Follow up TTE   - cardiology following     # Diet: Dysphagia 1 nectar thick   # GI ppx: not indicated   # DVT ppx: hold off for now  # Code status: DNR/DNI, if worsening please call family.   # Dispo: acute.         For Follow-Up:  - TTE   - repeat CTH in AM   - Neurology recs   - Lipid panel, TSH, HbA1c.

## 2021-04-14 NOTE — CONSULT NOTE ADULT - CONVERSATION DETAILS
Palliative NP and MD reviewed current condition and treatment with pt's daughter and son in law    Pt (+) CVA and cardiac event (+) plueral effusion. Last evening pt could possibly have been dying, condition improved today. Pt was made DNR/DNI/ CMO in ER. Pt currently now awake, tolerating PO dysphagia diet and is conversant.     Patient deferred medical decisions to her daughter who is he HCP. Pt is alert and oriented x 2-3.     Discussed with daughter and son in law options to stop comfort measures. Re discussed DNR/DNI. They definitely want to continue that. Will reinstate medical management. But do not want aggressive care like pressors, Alines or hemodialysis. They are undecided about NGT feeding will have discussion if this becomes an option if pt cannot swallow    They are receptive to palliative care

## 2021-04-14 NOTE — CONSULT NOTE ADULT - PROBLEM SELECTOR RECOMMENDATION 3
Pt with both pleural effusions and a pericardial effusion on admission. Has not been followed by MD in community, on no medications. Cardiology to assess

## 2021-04-14 NOTE — PROGRESS NOTE ADULT - SUBJECTIVE AND OBJECTIVE BOX
Hospital Day:  1d    Chief Complaint: Patient is a 89y old  Female who presents with a chief complaint of AMS (14 Apr 2021 12:14)    24 hour events: No acute overnight events. Patient seen this AM resting comfortably in bed.     Past Medical Hx:   No pertinent past medical history      Past Sx:  No significant past surgical history      Allergies:  No Known Allergies    Current Meds:   Standing Meds:    PRN Meds:  morphine  - Injectable 2 milliGRAM(s) IV Push every 4 hours PRN Mild Pain (1 - 3)    HOME MEDICATIONS:      Vital Signs:   T(F): 99.6 (04-13-21 @ 20:41), Max: 100.5 (04-13-21 @ 17:48)  HR: 99 (04-13-21 @ 20:41) (81 - 120)  BP: 122/98 (04-13-21 @ 19:13) (119/84 - 216/138)  RR: 26 (04-13-21 @ 20:41) (24 - 35)  SpO2: 94% (04-13-21 @ 20:41) (92% - 100%)        Physical Exam:   GENERAL: NAD  HEENT: NCAT, +NC   CHEST/LUNG: audible breath sounds b/l  HEART: Regular rate and rhythm; s1 s2 appreciated  ABDOMEN: Soft, Nontender, Nondistended abdomen   EXTREMITIES: No LE edema b/l  SKIN: no rashes, no new lesions  NERVOUS SYSTEM:  Alert, follows commands, L sided neuro deficits.         Labs:                         13.8   16.90 )-----------( 364      ( 13 Apr 2021 17:00 )             43.6     Neutophil% 78.4, Lymphocyte% 11.4, Monocyte% 9.7, Bands% 0.4 04-13-21 @ 17:00    13 Apr 2021 17:00    139    |  104    |  29     ----------------------------<  180    5.4     |  20     |  0.9      Ca    9.6        13 Apr 2021 17:00    TPro  7.3    /  Alb  4.2    /  TBili  0.5    /  DBili  x      /  AST  361    /  ALT  34     /  AlkPhos  137    13 Apr 2021 17:00       pTT    37.5             ----< 1.10 INR  (04-13-21 @ 17:00)    12.70        PT          Troponin 4.79, CKMB --, CK -- 04-13-21 @ 17:00    Radiology:   < from: CT Brain Stroke Protocol (04.13.21 @ 17:13) >  IMPRESSION:    There is loss of gray-white differentiation with hypodensity in the right frontoparietal region (6/21-24) as well as anteriorly in the right frontal region (5/22-23) consistent with areas of acute infarction.    Parenchymal volume loss compatible with the patient's age  No CT evidence of acute fracture,intracranial hemorrhage, midline shift, or mass effect.    Findings were discussed with Laina Jimenez at 5:42 PM 4/13/2021 with readback.    < end of copied text >  < from: CT Perfusion w/ Maps w/ IV Cont (04.13.21 @ 17:22) >  IMPRESSION:      No evidence of major vascular stenosis, occlusion, or aneurysm.    There are areas of hypoperfusion in the right frontal and right posterior parietal region with a total volume of hypoperfusion of 17 mL, with core infarct of 11 ml in the right frontal region.    The penumbra volume (mismatch volume) is 6 mL for the right posterior parietal region.      < end of copied text >  < from: CT Angio Chest w/ IV Cont (04.13.21 @ 18:45) >  IMPRESSION:    Thoracic and abdominal aortic calcified and soft plaque with no evidence of aortic aneurysm or dissection.    There is pericardial effusion with maximum thickness of approximately 1.7 cm.    Distended gallbladder. Cholelithiasis.    < end of copied text >  < from: CT Angio Abdomen and Pelvis w/ IV Cont (04.13.21 @ 18:45) >  IMPRESSION:    Thoracic and abdominal aortic calcified and soft plaque with no evidence ofaortic aneurysm or dissection.    There is pericardial effusion with maximum thickness of approximately 1.7 cm.    Distended gallbladder. Cholelithiasis.    < end of copied text >

## 2021-04-14 NOTE — CONSULT NOTE ADULT - PROBLEM SELECTOR RECOMMENDATION 9
Palliative care introduced to pt and family. Pt defers to daughter Ne, see GOC note. Pt will be DNR/DNI not comfort measures only. But if pt declines medically family will consider CMO again

## 2021-04-14 NOTE — PROGRESS NOTE ADULT - ASSESSMENT
89y Female with no known PMHx on no medications who presents from home for AMS, left sided weakness. Per son-in-law, pt spoke with family this morning at 11:00am and told them that she didn't feel well and hadn't slept well overnight, was answering questions appropriately during phone call. He states that last night she did not voice any complaints to family. Family went to house at around 4pm, found pt slumped in chair leaning to the left and called EMS. On arrival to ED, pt was hypertensive (216/168) and tachycardic with labored breathing. Stroke code called PTA. Pt able to provide limited Hx due to clinical condition, denies HA. Initial NIHSS 17, CTH showed early signs of acute infarct of R frontal/R frontoparietal region. Not a tPA candidate as she is out of window. CTP showed area of core infarct without any surrounding salvageable tissue, not a candidate for NI. At baseline pt lives alone and is functionally independent.    ED: STEMI Code was called  evaluated the patient in the ER by cardio fellow. EKG in the ER showed sinus tachycardia with anteroseptal Q waves (unknown chronicity). After discussion with Interventionalist on call (Dr. Troncoso) STEMI code was cancelled. Pt. with markedly elevated systemic BP in the ER with SBP of 180s. Bedside echo showed pericardial effusion. Hypertensive Emergency improved on Esmolol which is now off.  Dissection was ruled on CTA.     Family  Discussion Proxy  Yessica Guerra  and other family members  made patient comfort Care  Only with primary Treatment Goal  being  Comfort. Patient very Confused at bedside not answering  commands. Patient appeared approved this AM, family rescinded comfort care and would like medical management     # Acute  CVA   - Non-con CTH (4/14) showed loss of gray-white differentiation with hypodensity in the right frontoparietal region (6/21-24) as well as anteriorly in the right frontal region (5/22-23) consistent with areas of acute infarction.  - CT Perfusion (4/14) showed there are areas of hypoperfusion in the right frontal and right posterior parietal region with a total volume of hypoperfusion of 17 mL, with core infarct of 11 ml in the right frontal region. The penumbra volume (mismatch volume) is 6 mL for the right posterior parietal region.  - Patient was initially made comfort care in the ED, this AM neurological improvement was noted by family, palliative care had a conversation with the family and they would like to pursue medical management. They are ok with labs. If worsening they will revert to comfort care.   - Neurology made aware of patients change in medical status, will be upgraded to stroke unit or telemetry.   - was not a candidate for tPA or NI   - Permissive HTN.   - Patient has no known medical conditions, will obtain lipid profile, Hba1c, TTE with bubble.   - S+S evaluated the patient ok for Dysphagia 1 with nectar thick. Follow up S+S recs   - Follow up Neurology recs, especially about need for asa  - PT/OT     # NSTEMI  # Large Pericardial Effusion   # B/l Pericardial effusion   - Patient is not a candidate for intervention as per cardiology   - Follow up CXR   - Follow up TTE   - cardiology following     # Diet: Dysphagia 1 nectar thick   # GI ppx: not indicated   # DVT ppx: hold off for now  # Code status: DNR/DNI, if worsening please call family.   # Dispo: acute.                89y Female with no known PMHx on no medications who presents from home for AMS, left sided weakness. Per son-in-law, pt spoke with family this morning at 11:00am and told them that she didn't feel well and hadn't slept well overnight, was answering questions appropriately during phone call. He states that last night she did not voice any complaints to family. Family went to house at around 4pm, found pt slumped in chair leaning to the left and called EMS. On arrival to ED, pt was hypertensive (216/168) and tachycardic with labored breathing. Stroke code called PTA. Pt able to provide limited Hx due to clinical condition, denies HA. Initial NIHSS 17, CTH showed early signs of acute infarct of R frontal/R frontoparietal region. Not a tPA candidate as she is out of window. CTP showed area of core infarct without any surrounding salvageable tissue, not a candidate for NI. At baseline pt lives alone and is functionally independent.    ED: STEMI Code was called  evaluated the patient in the ER by cardio fellow. EKG in the ER showed sinus tachycardia with anteroseptal Q waves (unknown chronicity). After discussion with Interventionalist on call (Dr. Troncoso) STEMI code was cancelled. Pt. with markedly elevated systemic BP in the ER with SBP of 180s. Bedside echo showed pericardial effusion. Hypertensive Emergency improved on Esmolol which is now off.  Dissection was ruled on CTA.     Family  Discussion Proxy  Yessica Guerra  and other family members  made patient comfort Care  Only with primary Treatment Goal  being  Comfort. Patient very Confused at bedside not answering  commands. Patient appeared approved this AM, family rescinded comfort care and would like medical management     # Acute  CVA   - Non-con CTH (4/14) showed loss of gray-white differentiation with hypodensity in the right frontoparietal region (6/21-24) as well as anteriorly in the right frontal region (5/22-23) consistent with areas of acute infarction.  - CT Perfusion (4/14) showed there are areas of hypoperfusion in the right frontal and right posterior parietal region with a total volume of hypoperfusion of 17 mL, with core infarct of 11 ml in the right frontal region. The penumbra volume (mismatch volume) is 6 mL for the right posterior parietal region.  - Patient was initially made comfort care in the ED, this AM neurological improvement was noted by family, palliative care had a conversation with the family and they would like to pursue medical management. They are ok with labs. If worsening they will revert to comfort care.   - Neurology made aware of patients change in medical status, will be upgraded to stroke unit or telemetry.   - was not a candidate for tPA or NI on admission  - Permissive HTN.   - Patient has no known medical conditions, does not see physicians, will obtain lipid profile, Hba1c, TSH with T4 reflex, TTE with bubble.   - S+S evaluated the patient ok for Dysphagia 1 with nectar thick. Follow up S+S recs   - Follow up Neurology recs, especially about need for asa  - PT/OT     # NSTEMI  # Large Pericardial Effusion   # B/l Pericardial effusion   - Trop 4.79 on admission   - Patient is not a candidate for intervention as per cardiology   - Follow up CXR   - Follow up TTE   - cardiology following     # Diet: Dysphagia 1 nectar thick   # GI ppx: not indicated   # DVT ppx: hold off for now  # Code status: DNR/DNI, if worsening please call family.   # Dispo: acute.

## 2021-04-14 NOTE — PROGRESS NOTE ADULT - SUBJECTIVE AND OBJECTIVE BOX
SUBJ:  No new complains    MEDICATIONS  (STANDING):    MEDICATIONS  (PRN):  morphine  - Injectable 2 milliGRAM(s) IV Push every 4 hours PRN Mild Pain (1 - 3)            Vital Signs Last 24 Hrs  T(C): 37.6 (13 Apr 2021 20:41), Max: 38.1 (13 Apr 2021 17:48)  T(F): 99.6 (13 Apr 2021 20:41), Max: 100.5 (13 Apr 2021 17:48)  HR: 99 (13 Apr 2021 20:41) (81 - 120)  BP: 122/98 (13 Apr 2021 19:13) (119/84 - 216/138)  BP(mean): --  RR: 26 (13 Apr 2021 20:41) (24 - 35)  SpO2: 94% (13 Apr 2021 20:41) (92% - 100%)     REVIEW OF SYSTEMS:  [facility]    PHYSICAL EXAM:  · CONSTITUTIONAL:	Well-developed, well nourished    BMI-  ·RESPIRATORY:   airway patent; breath sounds equal; good air movement; respirations non-labored; clear to auscultation bilaterally; no chest wall tenderness; no intercostal retractions; no rales,rhonchi or wheeze  · CARDIOVASCULAR	regular rate and rhythm  no rub  no murmur  normal PMI  · EXTREMITIES: No cyanosis, clubbing or edema  · VASCULAR: 	Equal and normal pulses (carotid, femoral, dorsalis pedis)  	  TELEMETRY:    ECG:    TTE:    LABS:                        13.8   16.90 )-----------( 364      ( 13 Apr 2021 17:00 )             43.6     04-13    139  |  104  |  29<H>  ----------------------------<  180<H>  5.4<H>   |  20  |  0.9    Ca    9.6      13 Apr 2021 17:00    TPro  7.3  /  Alb  4.2  /  TBili  0.5  /  DBili  x   /  AST  361<H>  /  ALT  34  /  AlkPhos  137<H>  04-13    CARDIAC MARKERS ( 13 Apr 2021 17:00 )  x     / 4.79 ng/mL / x     / x     / x          PT/INR - ( 13 Apr 2021 17:00 )   PT: 12.70 sec;   INR: 1.10 ratio         PTT - ( 13 Apr 2021 17:00 )  PTT:37.5 sec    I&O's Summary    BNP  RADIOLOGY & ADDITIONAL STUDIES:    IMPRESSION AND PLAN:    Post CVA and NSTEMI  pericardial effusion  not candidate for intervention.  echocardiogram  discussed with medical team in rounds

## 2021-04-14 NOTE — ED ADULT NURSE REASSESSMENT NOTE - NS ED NURSE REASSESS COMMENT FT1
pt resting in bed comfortably. safety precautions maintained. 15 L Nonrebreather in place. morphine administered as per orders.

## 2021-04-14 NOTE — CONSULT NOTE ADULT - ASSESSMENT
89yFemale being evaluated for goals of care and symptom management. Pt alert and conversant. Pt aware of her change in medical condition. She denies pain or dyspnea. Able to make simple needs known. Able to eat puree food. Noted left facial weakness, left arm weakness. Left leg weaker than right but able to move. Pt deferred medical decisions to her daughter Yessica    Pt on oxygen 2 liters nasal cannula, appears comfortable. She explained to NP and MD she doesn't mind having labs and tests at this time. Support provided.     See [facility] note, pt family wants DNR/DNI medical management. No pressors or hemodialysis, new MOLST prepared    d/w medical resident in detail      MEDD (morphine equivalent daily dose): 6mg/24hrs       See Recs below.  -DNR/DNI, ongoing medical management  - Neurology, cardiology follow up as per medical team  - palliative care team following to address goals of care as needed, family has palliative care contact info to call as needed  Please call [phone] with questions or concerns 24/7.   We will continue to follow.

## 2021-04-14 NOTE — CONSULT NOTE ADULT - PROBLEM SELECTOR RECOMMENDATION 2
Pt with left sided weakness and dysphagia today. Pt was ambulatory, will need to be assessed by neurology and possible PT

## 2021-04-14 NOTE — CHART NOTE - NSCHARTNOTEFT_GEN_A_CORE
Pt was admitted by nocturnist earlier this morning.   On admission pt's family wanted to proceed with comfort measures only, but later changed their mind and want medical management.  Will transfer pt to telemetry unit, Q1 neurochecks.  F/u ECHO.   NPO until cleared by speech and swallow therapist.  Repeat CBC, BMP, troponin.  Start ASA, if cleared by neuro.  Despite changing status to "continue medical management" pt still not candidate for invasive procedures like cardiac cath or neurovascular interventions.

## 2021-04-14 NOTE — PROGRESS NOTE ADULT - SUBJECTIVE AND OBJECTIVE BOX
Stroke Progress Note:    MIQUEL JEFFREY    1. Chief Complaint: AMS , left sided weakness    HPI:   89y Female with no known PMHx on no medications who presents from home for AMS, left sided weakness. Per son-in-law, pt spoke with family this morning at 11:00am and told them that she didn't feel well and hadn't slept well overnight, was answering questions appropriately during phone call. He states that last night she did not voice any complaints to family. Family went to house at around 4pm, found pt slumped in chair leaning to the left and called EMS. On arrival to ED, pt was hypertensive (216/168) and tachycardic with labored breathing. Stroke code called PTA. Pt able to provide limited Hx due to clinical condition, denies HA. Initial NIHSS 17, CTH showed early signs of acute infarct of R frontal/R frontoparietal region. Not a tPA candidate as she is out of window. CTP showed area of core infarct without any surrounding salvageable tissue, not a candidate for NI. At baseline pt lives alone and is functionally independent.        2. Relevant PMH:   Prior ischemic stroke/TIA[ ], Afib [ ], CAD [ ], HTN [ ], DLD [ ], DM [ ], PVD [ ], Obesity [ ],   Sedentary lifestyle [ ], CHF [ ], IRMA [ ], Cancer Hx [ ].    3. Social History: Smoking [ ], Drug Use [ ], Alcohol Use [ ], Other [ ]    4. Possible Location of Stroke:    5. Relevant Brain Tissue Imaging: < from: CT Brain Stroke Protocol (21 @ 17:13) >    EXAM:  CT BRAIN STROKE PROTOCOL            PROCEDURE DATE:  2021            INTERPRETATION:  CLINICAL HISTORY/REASON FOR EXAM: Stroke code    Technique: Noncontrast head CT.  Contiguous unenhanced CT axial images of the head from the base to the vertex with coronal and sagittal reformats.    Comparison: None    Findings:    There is prominence of the ventricles, cortical sulci, and basal cisterns consistent with parenchymal volume loss.    There is loss of gray-white differentiation with hypodensity in the right frontoparietal region (-) as well as anteriorly in the right frontal region (-) consistent with areas of acute infarction.    A small hypodensity is noted in the left basal ganglia consistent with a chronic lacunarinfarct.    Patchy areas of hypodensity in the periventricular and subcortical white matter, consistent with chronic microvascular ischemic disease.    No evidence of acute intracranial hemorrhage or significant space-occupying lesion. No evidence ofmidline shift    Calcifications are noted in the region of the carotid siphons    Beam hardening artifact is noted overlying the brain stem and posterior fossa which is inherent to CT in this location. Aside from mild atrophy, the posterior fossa is otherwise unremarkable. The fourth ventricle is midline.    No depressed calvarial fracture.    The visualized portions of the paranasal sinuses and mastoids are unremarkable.        IMPRESSION:    There is loss of gray-white differentiation with hypodensity in the right frontoparietal region (-) as well as anteriorly in the right frontal region (-) consistent with areas of acute infarction.    Parenchymal volume loss compatible with the patient's age  No CT evidence of acute fracture,intracranial hemorrhage, midline shift, or mass effect.    Findings were discussed with Laina Jimenez at 5:42 PM 2021 with readback.    < end of copied text >      6. Relevant Cerebrovascular Imaging:   CT Angio Neck w/ IV Cont:   EXAM:  CT PERFUSION W MAPS IC        EXAM:  CT ANGIO NECK (W)AW IC        EXAM:  CT ANGIO BRAIN (W)AW IC          *** ADDENDUM 2021  ***    Perfusion Study    CLINICAL INDICATION: Stroke code.    TECHNIQUE: CT perfusion was performed utilizing a total of 50 mls of IV contrast was administered intravenously without complication and 50 mls were discarded. RAPID PERFUSION images were obtained. Color maps were reviewed.    Using a separate workstation, 3-D shaded surface reformations were made of vasculature. 3-D reformations were reviewed and included in interpretation of the official report.    *** END OF ADDENDUM 2021  ***        PROCEDURE DATE:  2021            INTERPRETATION:  Clinical History / Reason for exam: Stroke Code    Technique/CT Angiogram of the head and neck: Axial contiguous images were obtained of the head and neck following the intravenous administration of contrast. Reformatted images in the coronal and sagittal planes were acquired, as well as 3DMIP reconstructed images.    Comparison:      CT ANGIOGRAM    Findings:    NECK:    The visualized aortic arch and great vessel origins are patent. There is no stenosis at the origin of the right brachiocephalic, right and left common carotid, left subclavian, and right and left vertebral arteries.    The right and left common, internal and external carotid arteries are patent. There is no significant calcification and no stenosis at the common carotid artery bifurcations.  There is no evidence of significant stenosis or occlusion of the common carotid arteries, the carotid artery bifurcations, or along the course of the distal cervical portions of the right and left internal carotid arteries. There is tortuosity of the distal cervical portions of the right and left internal carotid arteries.    Normal branching pattern is noted of the bilateral external carotid arteries.    The vertebral arteries are patent.    HEAD:    The distal right and left internal carotid arteries are patent, with calcification but without significant stenosis, in the region of the cavernous and supraclinoid portions of the ICAs.    The ophthalmic arteries are patent.    There is normal contrast filling of the middle cerebral arteries and the anterior cerebral arteries bilaterally. No evidence of stenosis, occlusion or aneurysm.    The distal vertebral arteries are patent. The basilar artery is patent. There is normal filling of the PCAs and SCAs bilaterally. No evidence of stenosis, occlusion or aneurysm.      No venous abnormalities are noted    OTHER: There appears to be a large amount of pericardial fluid within the visualized portion of the mediastinum. Bilateral pleural effusions.    PERFUSION STUDY    FINDINGS:    TOTAL HYPOPERFUSION: Using the threshold of Tmax greater than 6 seconds, there are areas of hypoperfusion in the right frontal and right posterior parietal region with a total volume of hypoperfusion of 17 mL.    CORE INFARCT: Using the threshold of CBF less than 30%, there is core infarct of 11 ml in the right frontal region    PENUMBRA: The penumbra volume (mismatch volume) is 6 mL. The mismatch ratio is 1.5.        IMPRESSION:      No evidence of major vascular stenosis, occlusion, or aneurysm.    There are areas of hypoperfusion in the right frontal and right posterior parietal region with a total volume of hypoperfusion of 17 mL, with core infarct of 11 ml in the right frontal region.    The penumbra volume (mismatch volume) is 6 mL for the right posterior parietal region.        ***Please see the addendum at the top of this report. It may contain additional important information or changes.****        ADELIA BIGGS MD; Attending Interventional Radiologist  This document has been electronically signed. 2021  6:28PM  Addend:ADELIA BIGGS MD; Attending Interventional Radiologist  This addendum was electronically signed on: 2021  7:06PM. (21 @ 17:29)     CT Angio Head w/ IV Cont:   EXAM:  CT PERFUSION W MAPS IC        EXAM:  CT ANGIO NECK (W)AW IC        EXAM:  CT ANGIO BRAIN (W)AW IC          *** ADDENDUM 2021  ***    Perfusion Study    CLINICAL INDICATION: Stroke code.    TECHNIQUE: CT perfusion was performed utilizing a total of 50 mls of IV contrast was administered intravenously without complication and 50 mls were discarded. RAPID PERFUSION images were obtained. Color maps were reviewed.    Using a separate workstation, 3-D shaded surface reformations were made of vasculature. 3-D reformations were reviewed and included in interpretation of the official report.    *** END OF ADDENDUM 2021  ***        PROCEDURE DATE:  2021            INTERPRETATION:  Clinical History / Reason for exam: Stroke Code    Technique/CT Angiogram of the head and neck: Axial contiguous images were obtained of the head and neck following the intravenous administration of contrast. Reformatted images in the coronal and sagittal planes were acquired, as well as 3DMIP reconstructed images.    Comparison:      CT ANGIOGRAM    Findings:    NECK:    The visualized aortic arch and great vessel origins are patent. There is no stenosis at the origin of the right brachiocephalic, right and left common carotid, left subclavian, and right and left vertebral arteries.    The right and left common, internal and external carotid arteries are patent. There is no significant calcification and no stenosis at the common carotid artery bifurcations.  There is no evidence of significant stenosis or occlusion of the common carotid arteries, the carotid artery bifurcations, or along the course of the distal cervical portions of the right and left internal carotid arteries. There is tortuosity of the distal cervical portions of the right and left internal carotid arteries.    Normal branching pattern is noted of the bilateral external carotid arteries.    The vertebral arteries are patent.    HEAD:    The distal right and left internal carotid arteries are patent, with calcification but without significant stenosis, in the region of the cavernous and supraclinoid portions of the ICAs.    The ophthalmic arteries are patent.    There is normal contrast filling of the middle cerebral arteries and the anterior cerebral arteries bilaterally. No evidence of stenosis, occlusion or aneurysm.    The distal vertebral arteries are patent. The basilar artery is patent. There is normal filling of the PCAs and SCAs bilaterally. No evidence of stenosis, occlusion or aneurysm.      No venous abnormalities are noted    OTHER: There appears to be a large amount of pericardial fluid within the visualized portion of the mediastinum. Bilateral pleural effusions.    PERFUSION STUDY    FINDINGS:    TOTAL HYPOPERFUSION: Using the threshold of Tmax greater than 6 seconds, there are areas of hypoperfusion in the right frontal and right posterior parietal region with a total volume of hypoperfusion of 17 mL.    CORE INFARCT: Using the threshold of CBF less than 30%, there is core infarct of 11 ml in the right frontal region    PENUMBRA: The penumbra volume (mismatch volume) is 6 mL. The mismatch ratio is 1.5.        IMPRESSION:      No evidence of major vascular stenosis, occlusion, or aneurysm.    There are areas of hypoperfusion in the right frontal and right posterior parietal region with a total volume of hypoperfusion of 17 mL, with core infarct of 11 ml in the right frontal region.    The penumbra volume (mismatch volume) is 6 mL for the right posterior parietal region.        ***Please see the addendum at the top of this report. It may contain additional important information or changes.****        ADELIA BIGGS MD; Attending Interventional Radiologist  This document has been electronically signed. 2021  6:28PM  Addend:ADELIA BIGGS MD; Attending Interventional Radiologist  This addendum was electronically signed on: 2021  7:06PM. (21 @ 17:29)     CT Perfusion w/ Maps w/ IV Cont:   EXAM:  CT PERFUSION W MAPS IC        EXAM:  CT ANGIO NECK (W)AW IC        EXAM:  CT ANGIO BRAIN (W)AW IC          *** ADDENDUM 2021  ***    Perfusion Study    CLINICAL INDICATION: Stroke code.    TECHNIQUE: CT perfusion was performed utilizing a total of 50 mls of IV contrast was administered intravenously without complication and 50 mls were discarded. RAPID PERFUSION images were obtained. Color maps were reviewed.    Using a separate workstation, 3-D shaded surface reformations were made of vasculature. 3-D reformations were reviewed and included in interpretation of the official report.    *** END OF ADDENDUM 2021  ***        .    The distal right and left internal carotid arteries are patent, with calcification but without significant stenosis, in the region of the cavernous and supraclinoid portions of the ICAs.    The ophthalmic arteries are patent.    There is normal contrast filling of the middle cerebral arteries and the anterior cerebral arteries bilaterally. No evidence of stenosis, occlusion or aneurysm.    The distal vertebral arteries are patent. The basilar artery is patent. There is normal filling of the PCAs and SCAs bilaterally. No evidence of stenosis, occlusion or aneurysm.      No venous abnormalities are noted    OTHER: There appears to be a large amount of pericardial fluid within the visualized portion of the mediastinum. Bilateral pleural effusions.    PERFUSION STUDY    FINDINGS:    TOTAL HYPOPERFUSION: Using the threshold of Tmax greater than 6 seconds, there are areas of hypoperfusion in the right frontal and right posterior parietal region with a total volume of hypoperfusion of 17 mL.    CORE INFARCT: Using the threshold of CBF less than 30%, there is core infarct of 11 ml in the right frontal region    PENUMBRA: The penumbra volume (mismatch volume) is 6 mL. The mismatch ratio is 1.5.        IMPRESSION:      No evidence of major vascular stenosis, occlusion, or aneurysm.    There are areas of hypoperfusion in the right frontal and right posterior parietal region with a total volume of hypoperfusion of 17 mL, with core infarct of 11 ml in the right frontal region.    The penumbra volume (mismatch volume) is 6 mL for the right posterior parietal region.        ***Please see the addendum at the top of this report. It may contain additional important information or changes.****        ADELIA BIGGS MD; Attending Interventional Radiologist  This document has been electronically signed. 2021  6:28PM  Addend:ADELIA BIGGS MD; Attending Interventional Radiologist  This addendum was electronically signed on: 2021  7:06PM. (21 @ 17:22)         7. Relevant blood tests:      8. Relevant cardiac rhythm monitorin. Relevant Cardiac Structure: (TTE/SAIDA +/-):[ ]No intracardiac thrombus/[ ] no vegetation/[ ]no [place]/EF:    Home Medications:      MEDICATIONS  (STANDING):  aspirin enteric coated 81 milliGRAM(s) Oral once  atorvastatin 80 milliGRAM(s) Oral at bedtime      10. PT/OT/Speech/Rehab/S&Sw/ Cognitive eval results and recommendations:    11. Exam:    Vital Signs Last 24 Hrs  T(C): 37.6 (2021 20:41), Max: 38.1 (2021 17:48)  T(F): 99.6 (2021 20:41), Max: 100.5 (2021 17:48)  HR: 99 (2021 20:41) (81 - 120)  BP: 122/98 (2021 19:13) (119/84 - 216/138)  BP(mean): --  RR: 26 (2021 20:41) (24 - 35)  SpO2: 94% (2021 20:41) (92% - 100%)    12.   NIH STROKE SCALE  Item	                                                        Score  1 a.	Level of Consciousness	               	0  1 b. LOC Questions	                                0  1 c.	LOC Commands	                               	0  2.	Best Gaze	                                        0  3.	Visual	                                                2  4.	Facial Palsy	                                        2  5 a.	Motor Arm - Left	                                0  5 b.	Motor Arm - Right	                        0  6 a.	Motor Leg - Left	                                0  6 b.	Motor Leg - Right	                                0  7.	Limb Ataxia	                                        0  8.	Sensory	                                                0  9.	Language	                                        0  10.	Dysarthria	                                        1  11.	Extinction and Inattention  	        2  ______________________________________  TOTAL	                                                        0    Total NIHSS on admission:17      NIHSS yesterday:      NIHSS today: 7    mRS:  0 No symptoms at all  1 No significant disability despite symptoms; able to carry out all usual duties and activities without assistance  2 Slight disability; unable to carry out all previous activities, but able to look after own affairs  3 Moderate disability; requiring some help, but able to walk without assistance  4 Moderately severe disability; unable to walk without assistance and unable to attend to own bodily needs without assistance  5 Severe disability; bedridden, incontinent and requiring constant nursing care and attention  6 Dead      13. Impression: 89 y/o female with no PMHx presents from home for AMS and L sided weakness, LKW 11:00am. NIH SS on arrival 17. CTH showed early signs of acute R frontoparietal/frontal infarct. No LVO on CTA, CTP showed hypoperfusion of R frontoparietal/frontal region without significant penumbra. Not a candidate for tPA or NI. EKG in ED showed anteroseptal Q waves, code STEMI called, evaluated by Cardiology and cancelled. Concern for aortic dissection, CTA abd/chest did not confirm.   On todays exam she is much improved with NIHSS of 7 and family decided to proceed with full medical management.    Plan:  - telemetry  - Ischemic stroke workup including lipid panel, HgbA1c, TSH, TTE  - Start 81mg ASA, 80mg atorvastatin daily  - PT/OT when stable  - Repeat CTH        Plan discussed with  njeuroattshanique Bocanegra   Stroke Progress Note:    MIQUEL JEFFREY    1. Chief Complaint: AMS , left sided weakness    HPI:   89y Female with no known PMHx on no medications who presents from home for AMS, left sided weakness. Per son-in-law, pt spoke with family this morning at 11:00am and told them that she didn't feel well and hadn't slept well overnight, was answering questions appropriately during phone call. He states that last night she did not voice any complaints to family. Family went to house at around 4pm, found pt slumped in chair leaning to the left and called EMS. On arrival to ED, pt was hypertensive (216/168) and tachycardic with labored breathing. Stroke code called PTA. Pt able to provide limited Hx due to clinical condition, denies HA. Initial NIHSS 17, CTH showed early signs of acute infarct of R frontal/R frontoparietal region. Not a tPA candidate as she is out of window. CTP showed area of core infarct without any surrounding salvageable tissue, not a candidate for NI. At baseline pt lives alone and is functionally independent.        2. Relevant PMH:   Prior ischemic stroke/TIA[ ], Afib [ ], CAD [ ], HTN [ ], DLD [ ], DM [ ], PVD [ ], Obesity [ ],   Sedentary lifestyle [ ], CHF [ ], IRMA [ ], Cancer Hx [ ].    3. Social History: Smoking [ ], Drug Use [ ], Alcohol Use [ ], Other [ ]    4. Possible Location of Stroke:    5. Relevant Brain Tissue Imaging: < from: CT Brain Stroke Protocol (21 @ 17:13) >    EXAM:  CT BRAIN STROKE PROTOCOL            PROCEDURE DATE:  2021            INTERPRETATION:  CLINICAL HISTORY/REASON FOR EXAM: Stroke code    Technique: Noncontrast head CT.  Contiguous unenhanced CT axial images of the head from the base to the vertex with coronal and sagittal reformats.    Comparison: None    Findings:    There is prominence of the ventricles, cortical sulci, and basal cisterns consistent with parenchymal volume loss.    There is loss of gray-white differentiation with hypodensity in the right frontoparietal region (-) as well as anteriorly in the right frontal region (-) consistent with areas of acute infarction.    A small hypodensity is noted in the left basal ganglia consistent with a chronic lacunarinfarct.    Patchy areas of hypodensity in the periventricular and subcortical white matter, consistent with chronic microvascular ischemic disease.    No evidence of acute intracranial hemorrhage or significant space-occupying lesion. No evidence ofmidline shift    Calcifications are noted in the region of the carotid siphons    Beam hardening artifact is noted overlying the brain stem and posterior fossa which is inherent to CT in this location. Aside from mild atrophy, the posterior fossa is otherwise unremarkable. The fourth ventricle is midline.    No depressed calvarial fracture.    The visualized portions of the paranasal sinuses and mastoids are unremarkable.        IMPRESSION:    There is loss of gray-white differentiation with hypodensity in the right frontoparietal region (-) as well as anteriorly in the right frontal region (-) consistent with areas of acute infarction.    Parenchymal volume loss compatible with the patient's age  No CT evidence of acute fracture,intracranial hemorrhage, midline shift, or mass effect.    Findings were discussed with Laina Jimenez at 5:42 PM 2021 with readback.    < end of copied text >      6. Relevant Cerebrovascular Imaging:   CT Angio Neck w/ IV Cont:   EXAM:  CT PERFUSION W MAPS IC        EXAM:  CT ANGIO NECK (W)AW IC        EXAM:  CT ANGIO BRAIN (W)AW IC          *** ADDENDUM 2021  ***    Perfusion Study    CLINICAL INDICATION: Stroke code.    TECHNIQUE: CT perfusion was performed utilizing a total of 50 mls of IV contrast was administered intravenously without complication and 50 mls were discarded. RAPID PERFUSION images were obtained. Color maps were reviewed.    Using a separate workstation, 3-D shaded surface reformations were made of vasculature. 3-D reformations were reviewed and included in interpretation of the official report.    *** END OF ADDENDUM 2021  ***        PROCEDURE DATE:  2021            INTERPRETATION:  Clinical History / Reason for exam: Stroke Code    Technique/CT Angiogram of the head and neck: Axial contiguous images were obtained of the head and neck following the intravenous administration of contrast. Reformatted images in the coronal and sagittal planes were acquired, as well as 3DMIP reconstructed images.    Comparison:      CT ANGIOGRAM    Findings:    NECK:    The visualized aortic arch and great vessel origins are patent. There is no stenosis at the origin of the right brachiocephalic, right and left common carotid, left subclavian, and right and left vertebral arteries.    The right and left common, internal and external carotid arteries are patent. There is no significant calcification and no stenosis at the common carotid artery bifurcations.  There is no evidence of significant stenosis or occlusion of the common carotid arteries, the carotid artery bifurcations, or along the course of the distal cervical portions of the right and left internal carotid arteries. There is tortuosity of the distal cervical portions of the right and left internal carotid arteries.    Normal branching pattern is noted of the bilateral external carotid arteries.    The vertebral arteries are patent.    HEAD:    The distal right and left internal carotid arteries are patent, with calcification but without significant stenosis, in the region of the cavernous and supraclinoid portions of the ICAs.    The ophthalmic arteries are patent.    There is normal contrast filling of the middle cerebral arteries and the anterior cerebral arteries bilaterally. No evidence of stenosis, occlusion or aneurysm.    The distal vertebral arteries are patent. The basilar artery is patent. There is normal filling of the PCAs and SCAs bilaterally. No evidence of stenosis, occlusion or aneurysm.      No venous abnormalities are noted    OTHER: There appears to be a large amount of pericardial fluid within the visualized portion of the mediastinum. Bilateral pleural effusions.    PERFUSION STUDY    FINDINGS:    TOTAL HYPOPERFUSION: Using the threshold of Tmax greater than 6 seconds, there are areas of hypoperfusion in the right frontal and right posterior parietal region with a total volume of hypoperfusion of 17 mL.    CORE INFARCT: Using the threshold of CBF less than 30%, there is core infarct of 11 ml in the right frontal region    PENUMBRA: The penumbra volume (mismatch volume) is 6 mL. The mismatch ratio is 1.5.        IMPRESSION:      No evidence of major vascular stenosis, occlusion, or aneurysm.    There are areas of hypoperfusion in the right frontal and right posterior parietal region with a total volume of hypoperfusion of 17 mL, with core infarct of 11 ml in the right frontal region.    The penumbra volume (mismatch volume) is 6 mL for the right posterior parietal region.        ***Please see the addendum at the top of this report. It may contain additional important information or changes.****        ADELIA BIGGS MD; Attending Interventional Radiologist  This document has been electronically signed. 2021  6:28PM  Addend:ADELIA BIGGS MD; Attending Interventional Radiologist  This addendum was electronically signed on: 2021  7:06PM. (21 @ 17:29)     CT Angio Head w/ IV Cont:   EXAM:  CT PERFUSION W MAPS IC        EXAM:  CT ANGIO NECK (W)AW IC        EXAM:  CT ANGIO BRAIN (W)AW IC          *** ADDENDUM 2021  ***    Perfusion Study    CLINICAL INDICATION: Stroke code.    TECHNIQUE: CT perfusion was performed utilizing a total of 50 mls of IV contrast was administered intravenously without complication and 50 mls were discarded. RAPID PERFUSION images were obtained. Color maps were reviewed.    Using a separate workstation, 3-D shaded surface reformations were made of vasculature. 3-D reformations were reviewed and included in interpretation of the official report.    *** END OF ADDENDUM 2021  ***        PROCEDURE DATE:  2021            INTERPRETATION:  Clinical History / Reason for exam: Stroke Code    Technique/CT Angiogram of the head and neck: Axial contiguous images were obtained of the head and neck following the intravenous administration of contrast. Reformatted images in the coronal and sagittal planes were acquired, as well as 3DMIP reconstructed images.    Comparison:      CT ANGIOGRAM    Findings:    NECK:    The visualized aortic arch and great vessel origins are patent. There is no stenosis at the origin of the right brachiocephalic, right and left common carotid, left subclavian, and right and left vertebral arteries.    The right and left common, internal and external carotid arteries are patent. There is no significant calcification and no stenosis at the common carotid artery bifurcations.  There is no evidence of significant stenosis or occlusion of the common carotid arteries, the carotid artery bifurcations, or along the course of the distal cervical portions of the right and left internal carotid arteries. There is tortuosity of the distal cervical portions of the right and left internal carotid arteries.    Normal branching pattern is noted of the bilateral external carotid arteries.    The vertebral arteries are patent.    HEAD:    The distal right and left internal carotid arteries are patent, with calcification but without significant stenosis, in the region of the cavernous and supraclinoid portions of the ICAs.    The ophthalmic arteries are patent.    There is normal contrast filling of the middle cerebral arteries and the anterior cerebral arteries bilaterally. No evidence of stenosis, occlusion or aneurysm.    The distal vertebral arteries are patent. The basilar artery is patent. There is normal filling of the PCAs and SCAs bilaterally. No evidence of stenosis, occlusion or aneurysm.      No venous abnormalities are noted    OTHER: There appears to be a large amount of pericardial fluid within the visualized portion of the mediastinum. Bilateral pleural effusions.    PERFUSION STUDY    FINDINGS:    TOTAL HYPOPERFUSION: Using the threshold of Tmax greater than 6 seconds, there are areas of hypoperfusion in the right frontal and right posterior parietal region with a total volume of hypoperfusion of 17 mL.    CORE INFARCT: Using the threshold of CBF less than 30%, there is core infarct of 11 ml in the right frontal region    PENUMBRA: The penumbra volume (mismatch volume) is 6 mL. The mismatch ratio is 1.5.        IMPRESSION:      No evidence of major vascular stenosis, occlusion, or aneurysm.    There are areas of hypoperfusion in the right frontal and right posterior parietal region with a total volume of hypoperfusion of 17 mL, with core infarct of 11 ml in the right frontal region.    The penumbra volume (mismatch volume) is 6 mL for the right posterior parietal region.        ***Please see the addendum at the top of this report. It may contain additional important information or changes.****        ADELIA BIGGS MD; Attending Interventional Radiologist  This document has been electronically signed. 2021  6:28PM  Addend:ADELIA BIGGS MD; Attending Interventional Radiologist  This addendum was electronically signed on: 2021  7:06PM. (21 @ 17:29)     CT Perfusion w/ Maps w/ IV Cont:   EXAM:  CT PERFUSION W MAPS IC        EXAM:  CT ANGIO NECK (W)AW IC        EXAM:  CT ANGIO BRAIN (W)AW IC          *** ADDENDUM 2021  ***    Perfusion Study    CLINICAL INDICATION: Stroke code.    TECHNIQUE: CT perfusion was performed utilizing a total of 50 mls of IV contrast was administered intravenously without complication and 50 mls were discarded. RAPID PERFUSION images were obtained. Color maps were reviewed.    Using a separate workstation, 3-D shaded surface reformations were made of vasculature. 3-D reformations were reviewed and included in interpretation of the official report.    *** END OF ADDENDUM 2021  ***        .    The distal right and left internal carotid arteries are patent, with calcification but without significant stenosis, in the region of the cavernous and supraclinoid portions of the ICAs.    The ophthalmic arteries are patent.    There is normal contrast filling of the middle cerebral arteries and the anterior cerebral arteries bilaterally. No evidence of stenosis, occlusion or aneurysm.    The distal vertebral arteries are patent. The basilar artery is patent. There is normal filling of the PCAs and SCAs bilaterally. No evidence of stenosis, occlusion or aneurysm.      No venous abnormalities are noted    OTHER: There appears to be a large amount of pericardial fluid within the visualized portion of the mediastinum. Bilateral pleural effusions.    PERFUSION STUDY    FINDINGS:    TOTAL HYPOPERFUSION: Using the threshold of Tmax greater than 6 seconds, there are areas of hypoperfusion in the right frontal and right posterior parietal region with a total volume of hypoperfusion of 17 mL.    CORE INFARCT: Using the threshold of CBF less than 30%, there is core infarct of 11 ml in the right frontal region    PENUMBRA: The penumbra volume (mismatch volume) is 6 mL. The mismatch ratio is 1.5.        IMPRESSION:      No evidence of major vascular stenosis, occlusion, or aneurysm.    There are areas of hypoperfusion in the right frontal and right posterior parietal region with a total volume of hypoperfusion of 17 mL, with core infarct of 11 ml in the right frontal region.    The penumbra volume (mismatch volume) is 6 mL for the right posterior parietal region.        ***Please see the addendum at the top of this report. It may contain additional important information or changes.****        ADELIA BIGGS MD; Attending Interventional Radiologist  This document has been electronically signed. 2021  6:28PM  Addend:ADELIA BIGGS MD; Attending Interventional Radiologist  This addendum was electronically signed on: 2021  7:06PM. (21 @ 17:22)         7. Relevant blood tests:      8. Relevant cardiac rhythm monitorin. Relevant Cardiac Structure: (TTE/SAIDA +/-):[ ]No intracardiac thrombus/[ ] no vegetation/[ ]no [place]/EF:    Home Medications:      MEDICATIONS  (STANDING):  aspirin enteric coated 81 milliGRAM(s) Oral once  atorvastatin 80 milliGRAM(s) Oral at bedtime      10. PT/OT/Speech/Rehab/S&Sw/ Cognitive eval results and recommendations:    11. Exam:    Vital Signs Last 24 Hrs  T(C): 37.6 (2021 20:41), Max: 38.1 (2021 17:48)  T(F): 99.6 (2021 20:41), Max: 100.5 (2021 17:48)  HR: 99 (2021 20:41) (81 - 120)  BP: 122/98 (2021 19:13) (119/84 - 216/138)  BP(mean): --  RR: 26 (2021 20:41) (24 - 35)  SpO2: 94% (2021 20:41) (92% - 100%)    12.   NIH STROKE SCALE  Item	                                                        Score  1 a.	Level of Consciousness	               	0  1 b. LOC Questions	                                0  1 c.	LOC Commands	                               	0  2.	Best Gaze	                                        0  3.	Visual	                                                2  4.	Facial Palsy	                                        2  5 a.	Motor Arm - Left	                                0  5 b.	Motor Arm - Right	                        0  6 a.	Motor Leg - Left	                                0  6 b.	Motor Leg - Right	                                0  7.	Limb Ataxia	                                        0  8.	Sensory	                                                0  9.	Language	                                        0  10.	Dysarthria	                                        1  11.	Extinction and Inattention  	        2  ______________________________________  TOTAL	                                                        0    Total NIHSS on admission:17      NIHSS yesterday:      NIHSS today: 7    mRS:  0 No symptoms at all  1 No significant disability despite symptoms; able to carry out all usual duties and activities without assistance  2 Slight disability; unable to carry out all previous activities, but able to look after own affairs  3 Moderate disability; requiring some help, but able to walk without assistance  4 Moderately severe disability; unable to walk without assistance and unable to attend to own bodily needs without assistance  5 Severe disability; bedridden, incontinent and requiring constant nursing care and attention  6 Dead      13. Impression: 89 y/o female with no PMHx presents from home for AMS and L sided weakness, LKW 11:00am. NIH SS on arrival 17. CTH showed early signs of acute R frontoparietal/frontal infarct. No LVO on CTA, CTP showed hypoperfusion of R frontoparietal/frontal region without significant penumbra. Not a candidate for tPA or NI. EKG in ED showed anteroseptal Q waves, code STEMI called, evaluated by Cardiology and cancelled. Concern for aortic dissection, CTA abd/chest did not confirm.   On todays exam she is much improved with NIHSS of 7 and family decided to proceed with full medical management.    Plan:  - telemetry  - Ischemic stroke workup including lipid panel, HgbA1c, TSH, TTE with contrast  - Start 81mg ASA, 80mg atorvastatin daily  - PT/OT when stable  - Repeat CTH  - EPS evaluation for ILR          Plan discussed with  njeuroattending Dr. Villa Bocanegra

## 2021-04-14 NOTE — CONSULT NOTE ADULT - SUBJECTIVE AND OBJECTIVE BOX
MIQUEL JEFFREY          MRN-[id number]              HPI:   89y Female with no known PMHx on no medications who presents from home for AMS, left sided weakness. Per son-in-law, pt spoke with family this morning at 11:00am and told them that she didn't feel well and hadn't slept well overnight, was answering questions appropriately during phone call. He states that last night she did not voice any complaints to family. Family went to house at around 4pm, found pt slumped in chair leaning to the left and called EMS. On arrival to ED, pt was hypertensive (216/168) and tachycardic with labored breathing. Stroke code called PTA. Pt able to provide limited Hx due to clinical condition, denies HA. Initial NIHSS 17, CTH showed early signs of acute infarct of R frontal/R frontoparietal region. Not a tPA candidate as she is out of window. CTP showed area of core infarct without any surrounding salvageable tissue, not a candidate for NI. At baseline pt lives alone and is functionally independent.        ED: STEMI Code was called  evaluated the patient in the ER by cardio fellow. EKG in the ER showed sinus tachycardia with anteroseptal Q waves (unknown chronicity). After discussion with Interventionalist on call (Dr. Troncoso) STEMI code was cancelled. Pt. with markedly elevated systemic BP in the ER with SBP of 180s. Bedside echo shows mild pericardial effusion. Hypertensive Emergency improved on Esmolol which is now off.  Dissection was ruled on CTA.     Family  Discussion Proxy  Yessica Guerra  and other family members  made patient comfort Care  Only with primary Treatment Goal  being  Comfort. Patient very Confused at bedside not answering  commands.        (13 Apr 2021 21:22)      PAST MEDICAL & SURGICAL HISTORY:  No pertinent past medical history    No significant past surgical history        FAMILY HISTORY:   Reviewed and found non contributory in mother or father    SOCIAL HISTORY:     ROS:    Unable to attain due to:                      Dyspnea (Suzanne 0-10): 0                       N/V (Y/N): No                             Secretions (Y/N) : No                                          Agitation(Y/N): No                              Pain (Y/N): No                                 -Provocation/Palliation: N/A  -Quality/Quantity: N/A  -Radiating: N/A  -Severity: No pain  -Timing/Frequency: N/A  -Impact on ADLs: N/A    General:  Denied  HEENT:    Denied  Neck:  Denied  CVS:  Denied  Resp:  Denied  GI:  Denied    :  Denied  Musc:  Denied  Neuro:  Denied  Psych:  Denied  Skin:  Denied  Lymph:  Denied    Allergies    No Known Allergies    Intolerances      Opiate Naive (Y/N):   -iStop reviewed (Y/N):   Ref#:              Medications:      MEDICATIONS  (STANDING):    MEDICATIONS  (PRN):  morphine  - Injectable 2 milliGRAM(s) IV Push every 4 hours PRN Mild Pain (1 - 3)      Labs:    CBC:                        13.8   16.90 )-----------( 364      ( 13 Apr 2021 17:00 )             43.6     CMP:    04-13    139  |  104  |  29<H>  ----------------------------<  180<H>  5.4<H>   |  20  |  0.9    Ca    9.6      13 Apr 2021 17:00    TPro  7.3  /  Alb  4.2  /  TBili  0.5  /  DBili  x   /  AST  361<H>  /  ALT  34  /  AlkPhos  137<H>  04-13     Albumin, Serum: 4.2 g/dL (04-13-21 @ 17:00)    PT/INR - ( 13 Apr 2021 17:00 )   PT: 12.70 sec;   INR: 1.10 ratio         PTT - ( 13 Apr 2021 17:00 )  PTT:37.5 sec         Imaging:  Reviewed    PEx:  T(C): 37.6 (04-13-21 @ 20:41), Max: 38.1 (04-13-21 @ 17:48)  HR: 99 (04-13-21 @ 20:41) (81 - 120)  BP: 122/98 (04-13-21 @ 19:13) (119/84 - 216/138)  RR: 26 (04-13-21 @ 20:41) (24 - 35)  SpO2: 94% (04-13-21 @ 20:41) (92% - 100%)  Wt(kg): --  Daily Height in cm: 157.48 (13 Apr 2021 17:00)    Daily     General: AAOx3    found in bed in NAD  HEENT:  NCAT PERRL EOMI Non icteric MOM  Neck: Supple no masses  CVS: RR S1S2 No M/G/R no edema   Resp: Unlabored Non tachypneic, on oxygen, mild expiratory wheeze noted  GI:  Soft NT ND BS+  : Primafit   Musc: No C/C/E  - feet with contracted toes b/l  Neuro: Follows commands unable to lift left arm, leg leg less strength  Psych: Calm Pleasant  Skin: Non jaundiced - keratosis  Lymph: Normal    Preadmit Karnofsky:  %           Current Karnofsky:     %  http://www.npcrc.org/files/news/karnofsky_performance_scale.pdf   http://www.npcrc.org/files/news/palliative_performance_scale_PPSv2.pdf  Cachexia (Y/N): no  BMI:    Advanced Directives:  DNR/DNI now     Decision maker: The patient is able to participate in complex medical decision making conversations.   Legal surrogate:    GOALS OF CARE DISCUSSION       Palliative care info/counseling provided	           Family meeting       Advanced Directives addressed please see Advance Care Planning Note	           See previous Palliative Medicine Note       Documentation of GOC: 	    REFERRALS	        Palliative Med        Unit SW/Case Mgmt              Speech/Swallow       Patient/Family Support       Massage Therapy       Music Therapy       Pet Therapy       Hospice       Nutrition       Dietician       PT/OT MIQUEL JEFFREY          MRN-[id number]              HPI:   89y Female with no known PMHx on no medications who presents from home for AMS, left sided weakness. Per son-in-law, pt spoke with family this morning at 11:00am and told them that she didn't feel well and hadn't slept well overnight, was answering questions appropriately during phone call. He states that last night she did not voice any complaints to family. Family went to house at around 4pm, found pt slumped in chair leaning to the left and called EMS. On arrival to ED, pt was hypertensive (216/168) and tachycardic with labored breathing. Stroke code called PTA. Pt able to provide limited Hx due to clinical condition, denies HA. Initial NIHSS 17, CTH showed early signs of acute infarct of R frontal/R frontoparietal region. Not a tPA candidate as she is out of window. CTP showed area of core infarct without any surrounding salvageable tissue, not a candidate for NI. At baseline pt lives alone and is functionally independent.        ED: STEMI Code was called  evaluated the patient in the ER by cardio fellow. EKG in the ER showed sinus tachycardia with anteroseptal Q waves (unknown chronicity). After discussion with Interventionalist on call (Dr. Troncoso) STEMI code was cancelled. Pt. with markedly elevated systemic BP in the ER with SBP of 180s. Bedside echo shows mild pericardial effusion. Hypertensive Emergency improved on Esmolol which is now off.  Dissection was ruled on CTA.     Family  Discussion Proxy  Yessica Guerra  and other family members  made patient comfort Care  Only with primary Treatment Goal  being  Comfort. Patient very Confused at bedside not answering  commands.     Patient seen and examined at bedside. She was awake and able to answer qs, Spoke to patient's daughter, please see [facility] note.      (13 Apr 2021 21:22)      PAST MEDICAL & SURGICAL HISTORY:  No pertinent past medical history    No significant past surgical history        FAMILY HISTORY:   Reviewed and found non contributory in mother or father    SOCIAL HISTORY:     ROS:  as below   Unable to attain due to:                      Dyspnea (Suzanne 0-10): 0                       N/V (Y/N): No                             Secretions (Y/N) : No                                          Agitation(Y/N): No                              Pain (Y/N): No                                 -Provocation/Palliation: N/A  -Quality/Quantity: N/A  -Radiating: N/A  -Severity: No pain  -Timing/Frequency: N/A  -Impact on ADLs: N/A    General:  Denied  HEENT:    Denied  Neck:  Denied  CVS:  Denied  Resp:  Denied  GI:  Denied    :  Denied  Musc:  Denied  Neuro:  Denied  Psych:  Denied  Skin:  Denied  Lymph:  Denied    Allergies    No Known Allergies    Intolerances      Opiate Naive (Y/N):   -iStop reviewed (Y/N):   Ref#:              Medications:      MEDICATIONS  (STANDING):    MEDICATIONS  (PRN):  morphine  - Injectable 2 milliGRAM(s) IV Push every 4 hours PRN Mild Pain (1 - 3)      Labs:    CBC:                        13.8   16.90 )-----------( 364      ( 13 Apr 2021 17:00 )             43.6     CMP:    04-13    139  |  104  |  29<H>  ----------------------------<  180<H>  5.4<H>   |  20  |  0.9    Ca    9.6      13 Apr 2021 17:00    TPro  7.3  /  Alb  4.2  /  TBili  0.5  /  DBili  x   /  AST  361<H>  /  ALT  34  /  AlkPhos  137<H>  04-13     Albumin, Serum: 4.2 g/dL (04-13-21 @ 17:00)    PT/INR - ( 13 Apr 2021 17:00 )   PT: 12.70 sec;   INR: 1.10 ratio         PTT - ( 13 Apr 2021 17:00 )  PTT:37.5 sec         Imaging:  Reviewed    PEx:  T(C): 37.6 (04-13-21 @ 20:41), Max: 38.1 (04-13-21 @ 17:48)  HR: 99 (04-13-21 @ 20:41) (81 - 120)  BP: 122/98 (04-13-21 @ 19:13) (119/84 - 216/138)  RR: 26 (04-13-21 @ 20:41) (24 - 35)  SpO2: 94% (04-13-21 @ 20:41) (92% - 100%)  Wt(kg): --  Daily Height in cm: 157.48 (13 Apr 2021 17:00)    Daily     General: AAOx3    found in bed in NAD  HEENT:  NCAT PERRL EOMI Non icteric MOM  Neck: Supple no masses  CVS: RR S1S2 No M/G/R no edema   Resp: Unlabored Non tachypneic, on oxygen, mild expiratory wheeze noted  GI:  Soft NT ND BS+  : Primafit   Musc: No C/C/E  - feet with contracted toes b/l  Neuro: Follows commands unable to lift left arm, leg leg less strength  Psych: Calm Pleasant  Skin: Non jaundiced - keratosis  Lymph: Normal    Preadmit Karnofsky:  %           Current Karnofsky:     %  http://www.npcrc.org/files/news/karnofsky_performance_scale.pdf   http://www.npcrc.org/files/news/palliative_performance_scale_PPSv2.pdf  Cachexia (Y/N): no  BMI:    Advanced Directives:  DNR/DNI now     Decision maker: The patient is able to participate in complex medical decision making conversations.   Legal surrogate:    GOALS OF CARE DISCUSSION       Palliative care info/counseling provided	           Family meeting       Advanced Directives addressed please see Advance Care Planning Note	           See previous Palliative Medicine Note       Documentation of GOC: 	    REFERRALS	        Palliative Med        Unit SW/Case Mgmt              Speech/Swallow       Patient/Family Support       Massage Therapy       Music Therapy       Pet Therapy       Hospice       Nutrition       Dietician       PT/OT MIQUEL JEFFREY          MRN-[id number]              HPI:   89y Female with no known PMHx on no medications who presents from home for AMS, left sided weakness. Per son-in-law, pt spoke with family this morning at 11:00am and told them that she didn't feel well and hadn't slept well overnight, was answering questions appropriately during phone call. He states that last night she did not voice any complaints to family. Family went to house at around 4pm, found pt slumped in chair leaning to the left and called EMS. On arrival to ED, pt was hypertensive (216/168) and tachycardic with labored breathing. Stroke code called PTA. Pt able to provide limited Hx due to clinical condition, denies HA. Initial NIHSS 17, CTH showed early signs of acute infarct of R frontal/R frontoparietal region. Not a tPA candidate as she is out of window. CTP showed area of core infarct without any surrounding salvageable tissue, not a candidate for NI. At baseline pt lives alone and is functionally independent.        ED: STEMI Code was called  evaluated the patient in the ER by cardio fellow. EKG in the ER showed sinus tachycardia with anteroseptal Q waves (unknown chronicity). After discussion with Interventionalist on call (Dr. Troncoso) STEMI code was cancelled. Pt. with markedly elevated systemic BP in the ER with SBP of 180s. Bedside echo shows mild pericardial effusion. Hypertensive Emergency improved on Esmolol which is now off.  Dissection was ruled on CTA.     Family  Discussion Proxy  Yessica Guerra  and other family members  made patient comfort Care  Only with primary Treatment Goal  being  Comfort. Patient very Confused at bedside not answering  commands.     Patient seen and examined at bedside. She was awake and able to answer qs, Spoke to patient's daughter, please see [facility] note.      (13 Apr 2021 21:22)      PAST MEDICAL & SURGICAL HISTORY:  No pertinent past medical history    No significant past surgical history        FAMILY HISTORY:   Reviewed and found non contributory in mother or father    SOCIAL HISTORY:     ROS:  as below   Unable to attain due to:                      Dyspnea (Suzanne 0-10): 0                       N/V (Y/N): No                             Secretions (Y/N) : No                                          Agitation(Y/N): No                              Pain (Y/N): No                                 -Provocation/Palliation: N/A  -Quality/Quantity: N/A  -Radiating: N/A  -Severity: No pain  -Timing/Frequency: N/A  -Impact on ADLs: N/A    General:  Denied  HEENT:    Denied  Neck:  Denied  CVS:  Denied  Resp:  Denied  GI:  Denied    :  Denied  Musc:  Denied  Neuro:  Denied  Psych:  Denied  Skin:  Denied  Lymph:  Denied    Allergies    No Known Allergies    Intolerances      Opiate Naive (Y/N):   -iStop reviewed (Y/N):   Ref#:         Darline Canseco | Reference #: [id number]        Medications:      MEDICATIONS  (STANDING):    MEDICATIONS  (PRN):  morphine  - Injectable 2 milliGRAM(s) IV Push every 4 hours PRN Mild Pain (1 - 3)      Labs:    CBC:                        13.8   16.90 )-----------( 364      ( 13 Apr 2021 17:00 )             43.6     CMP:    04-13    139  |  104  |  29<H>  ----------------------------<  180<H>  5.4<H>   |  20  |  0.9    Ca    9.6      13 Apr 2021 17:00    TPro  7.3  /  Alb  4.2  /  TBili  0.5  /  DBili  x   /  AST  361<H>  /  ALT  34  /  AlkPhos  137<H>  04-13     Albumin, Serum: 4.2 g/dL (04-13-21 @ 17:00)    PT/INR - ( 13 Apr 2021 17:00 )   PT: 12.70 sec;   INR: 1.10 ratio         PTT - ( 13 Apr 2021 17:00 )  PTT:37.5 sec         Imaging:  Reviewed    ekg reviewed     PEx:  T(C): 37.6 (04-13-21 @ 20:41), Max: 38.1 (04-13-21 @ 17:48)  HR: 99 (04-13-21 @ 20:41) (81 - 120)  BP: 122/98 (04-13-21 @ 19:13) (119/84 - 216/138)  RR: 26 (04-13-21 @ 20:41) (24 - 35)  SpO2: 94% (04-13-21 @ 20:41) (92% - 100%)  Wt(kg): --  Daily Height in cm: 157.48 (13 Apr 2021 17:00)    Daily     General: AAOx3    found in bed in NAD  HEENT:  NCAT PERRL EOMI Non icteric MOM  Neck: Supple no masses  CVS: RR S1S2 No M/G/R no edema   Resp: Unlabored Non tachypneic, on oxygen, mild expiratory wheeze noted  GI:  Soft NT ND BS+  : Primafit   Musc: No C/C/E  - feet with contracted toes b/l  Neuro: Follows commands unable to lift left arm, leg leg less strength  Psych: Calm Pleasant  Skin: Non jaundiced - keratosis  Lymph: Normal    Preadmit Karnofsky:  %           Current Karnofsky:     %  http://www.npcrc.org/files/news/karnofsky_performance_scale.pdf   http://www.npcrc.org/files/news/palliative_performance_scale_PPSv2.pdf  Cachexia (Y/N): no  BMI:    Advanced Directives:  DNR/DNI now     Decision maker: The patient is not  able to participate in complex medical decision making conversations.   Legal surrogate: daughter     GOALS OF CARE DISCUSSION       Palliative care info/counseling provided	           Family meeting       Advanced Directives addressed please see Advance Care Planning Note	           See previous Palliative Medicine Note       Documentation of GOC: 	    REFERRALS	        Palliative Med        Unit SW/Case Mgmt              Speech/Swallow       Patient/Family Support       Massage Therapy       Music Therapy       Pet Therapy       Hospice       Nutrition       Dietician       PT/OT

## 2021-04-15 DIAGNOSIS — R53.82 CHRONIC FATIGUE, UNSPECIFIED: ICD-10-CM

## 2021-04-15 LAB
T4 FREE+ TSH PNL SERPL: 0.78 UIU/ML — SIGNIFICANT CHANGE UP (ref 0.27–4.2)
TROPONIN T SERPL-MCNC: 4.33 NG/ML — CRITICAL HIGH

## 2021-04-15 PROCEDURE — 99232 SBSQ HOSP IP/OBS MODERATE 35: CPT

## 2021-04-15 PROCEDURE — 70450 CT HEAD/BRAIN W/O DYE: CPT | Mod: 26

## 2021-04-15 PROCEDURE — 99233 SBSQ HOSP IP/OBS HIGH 50: CPT

## 2021-04-15 RX ORDER — ASPIRIN/CALCIUM CARB/MAGNESIUM 324 MG
81 TABLET ORAL DAILY
Refills: 0 | Status: DISCONTINUED | OUTPATIENT
Start: 2021-04-15 | End: 2021-04-21

## 2021-04-15 RX ORDER — ENOXAPARIN SODIUM 100 MG/ML
40 INJECTION SUBCUTANEOUS DAILY
Refills: 0 | Status: DISCONTINUED | OUTPATIENT
Start: 2021-04-15 | End: 2021-04-20

## 2021-04-15 RX ADMIN — ATORVASTATIN CALCIUM 80 MILLIGRAM(S): 80 TABLET, FILM COATED ORAL at 22:12

## 2021-04-15 RX ADMIN — Medication 81 MILLIGRAM(S): at 11:29

## 2021-04-15 NOTE — PROGRESS NOTE ADULT - ASSESSMENT
89yFemale being evaluated for goals of care and symptom management. Patient has no known PMHx on no medications who presents from home for AMS, left sided weakness. Patient had CVA and NSTEMI.  Patient improved, now receiving workup for AMS.     d/w medical resident, bedside RN       MEDD (morphine equivalent daily dose): 0       See Recs below.  Please call [phone] with questions or concerns 24/7.   We will continue to follow.

## 2021-04-15 NOTE — PROGRESS NOTE ADULT - ASSESSMENT
89 y/o Female with no known PMHx on no medications who presents from home for AMS, left sided weakness.     # Acute CVA   - Non-con CTH (4/14) showed loss of gray-white differentiation with hypodensity in the right frontoparietal region (6/21-24) as well as anteriorly in the right frontal region (5/22-23) consistent with areas of acute infarction.  - CT Perfusion (4/14) showed there are areas of hypoperfusion in the right frontal and right posterior parietal region with a total volume of hypoperfusion of 17 mL, with core infarct of 11 ml in the right frontal region. The penumbra volume (mismatch volume) is 6 mL for the right posterior parietal region.  - Repeat CTH: Redemonstration of an evolving right MCA territorial infarct with increased though mild associated cytotoxic edema and minimal midline shift to the left. No evidence of hemorrhagic transformation. Age-indeterminate left thalamic as well as caudate head lacunar infarcts.  - Patient was initially made comfort care in the ED, this AM neurological improvement was noted by family, palliative care had a conversation with the family and they would like to pursue medical management. They are ok with labs. If worsening they will revert to comfort care.   - Was not a candidate for tPA or NI on admission  - Permissive HTN  - Patient has no known medical conditions, does not see physicians, will obtain lipid profile, Hba1c, TSH with T4 reflex, TTE with bubble.   - S+S evaluated the patient ok for Dysphagia 1 with nectar thick. Follow up S+S recs   - Neurology c/s: Start 81mg ASA, 80mg atorvastatin daily, EPS evaluation for ILR  - f/u w/ Neuro about need for AC  - PT/OT    # NSTEMI  # Large Pericardial Effusion   # B/l Pericardial Effusion   - Trop 4.79 on admission   - Patient is not a candidate for intervention as per cardiology   - Follow up CXR, TTE   - f/u repeat Trops  - Medical management w/ ASA, Plavix  - f/u Neuro for need of AC in light of patient had recent stroke    #Diet: Dysphagia 1 nectar thick   #GI ppx: not indicated   #DVT ppx: hold off for now  #Code status: DNR/DNI, if worsening please call family.   #Dispo: acute

## 2021-04-15 NOTE — SWALLOW BEDSIDE ASSESSMENT ADULT - ORAL PREPARATORY PHASE
Reduced oral grading/Anterior loss of bolus/Decreased mastication ability Reduced oral grading/Lateral loss of bolus

## 2021-04-15 NOTE — SWALLOW BEDSIDE ASSESSMENT ADULT - ORAL PHASE
Delayed oral transit time/Stasis in lateral sulci moderate/Delayed oral transit time/Stasis in lateral sulci/Lingual stasis Delayed oral transit time

## 2021-04-15 NOTE — PROGRESS NOTE ADULT - SUBJECTIVE AND OBJECTIVE BOX
MIQUEL JEFFREY             MRN-[id number]    CC:    HPI:   89y Female with no known PMHx on no medications who presents from home for AMS, left sided weakness. Per son-in-law, pt spoke with family this morning at 11:00am and told them that she didn't feel well and hadn't slept well overnight, was answering questions appropriately during phone call. He states that last night she did not voice any complaints to family. Family went to house at around 4pm, found pt slumped in chair leaning to the left and called EMS. On arrival to ED, pt was hypertensive (216/168) and tachycardic with labored breathing. Stroke code called PTA. Pt able to provide limited Hx due to clinical condition, denies HA. Initial NIHSS 17, CTH showed early signs of acute infarct of R frontal/R frontoparietal region. Not a tPA candidate as she is out of window. CTP showed area of core infarct without any surrounding salvageable tissue, not a candidate for NI. At baseline pt lives alone and is functionally independent.    ED: STEMI Code was called  evaluated the patient in the ER by cardio fellow. EKG in the ER showed sinus tachycardia with anteroseptal Q waves (unknown chronicity). After discussion with Interventionalist on call (Dr. Troncoso) STEMI code was cancelled. Pt. with markedly elevated systemic BP in the ER with SBP of 180s. Bedside echo shows mild pericardial effusion. Hypertensive Emergency improved on Esmolol which is now off.  Dissection was ruled on CTA.     Family  Discussion Proxy  Yessica Guerra  and other family members  made patient comfort Care  Only with primary Treatment Goal  being  Comfort. Patient very Confused at bedside not answering  commands.        (13 Apr 2021 21:22)      SUBJECTIVE: Patient seen and examined at bedside. She was tired and fatigued. deines pain. Patient now on telemetry floor.     ROS:  DYSPNEA: N	  NAUS/VOM: N	  SECRETIONS:  N	  AGITATION:  N  Pain (Y/N):   NA     -Provocation/Palliation:  -Quality/Quantity:  -Radiating:  -Severity:  -Timing/Frequency:  -Impact on ADLs:    OTHER REVIEW OF SYSTEMS: weakness and fatigue   UNABLE TO OBTAIN  due to:     PEx:  89y            eneral: AAOx3    found in bed in NAD  HEENT:  NCAT PERRL EOMI Non icteric MOM  Neck: Supple no masses  CVS: RR S1S2 No M/G/R no edema   Resp: Unlabored Non tachypneic, on oxygen, mild expiratory wheeze noted  GI:  Soft NT ND BS+  : Primafit   Musc: No C/C/E  - feet with contracted toes b/l  Neuro: Follows commands unable to lift left arm, leg leg less strength  Psych: Calm Pleasant  Skin: Non jaundiced - keratosis  Lymph: Normal      Last BM:  yesterday       ALLERGIES:  NKDA    OPIATE NAÏVE (Y/N): y    MEDICATIONS: REVIEWED  MEDICATIONS  (STANDING):  aspirin  chewable 81 milliGRAM(s) Oral daily  atorvastatin 80 milliGRAM(s) Oral at bedtime    MEDICATIONS  (PRN):  morphine  - Injectable 2 milliGRAM(s) IV Push every 4 hours PRN Mild Pain (1 - 3)      LABS: REVIEWED  CBC:                        12.4   10.60 )-----------( 262      ( 14 Apr 2021 17:30 )             40.2     CMP:    04-14    140  |  106  |  25<H>  ----------------------------<  136<H>  4.6   |  18  |  0.9    Ca    9.1      14 Apr 2021 17:30  Mg     2.3     04-14    TPro  6.3  /  Alb  3.6  /  TBili  0.5  /  DBili  x   /  AST  201<H>  /  ALT  40  /  AlkPhos  125<H>  04-14  Albumin, Serum: 3.6 g/dL (04-14-21 @ 17:30)      IMAGING: REVIEWED    Non-con CTH (4/14) showed loss of gray-white differentiation with hypodensity in the right frontoparietal region (6/21-24) as well as anteriorly in the right frontal region (5/22-23) consistent with areas of acute infarction.    - CT Perfusion (4/14) showed there are areas of hypoperfusion in the right frontal and right posterior parietal region with a total volume of hypoperfusion of 17 mL, with core infarct of 11 ml in the right frontal region. The penumbra volume (mismatch volume) is 6 mL for the right posterior parietal region.      EKG reviewed     ADVANCED DIRECTIVES:              DNR DNI            LIVING WILL            DPOA       HCP       MOLST    DECISION MAKER:  patient defers to daughter   LEGAL SURROGATE: Daughter     PSYCHOSOCIAL-SPIRITUAL ASSESSMENT:       Reviewed       Care plan unchanged      GOALS OF CARE DISCUSSION       Palliative care info/counseling provided	           Family meeting       Advanced Directives addressed please see Advance Care Planning Note	           See previous Palliative Medicine Note       Documentation of GOC: ongoing     CURRENT DISPO PLAN:     WILL REMAIN IN HOSPITAL        REFERRALS	        Palliative Med        Unit SW/Case Mgmt

## 2021-04-15 NOTE — PROGRESS NOTE ADULT - SUBJECTIVE AND OBJECTIVE BOX
SUBJECTIVE:  89y Female with no known PMHx on no medications who presents from home for AMS, left sided weakness. Per son-in-law, pt spoke with family this morning at 11:00am and told them that she didn't feel well and hadn't slept well overnight, was answering questions appropriately during phone call. He states that last night she did not voice any complaints to family. Family went to house at around 4pm, found pt slumped in chair leaning to the left and called EMS. On arrival to ED, pt was hypertensive (216/168) and tachycardic with labored breathing. Stroke code called PTA. Pt able to provide limited Hx due to clinical condition, denies HA. Initial NIHSS 17, CTH showed early signs of acute infarct of R frontal/R frontoparietal region. Not a tPA candidate as she is out of window. CTP showed area of core infarct without any surrounding salvageable tissue, not a candidate for NI. At baseline pt lives alone and is functionally independent.    ED: STEMI Code was called  evaluated the patient in the ER by cardio fellow. EKG in the ER showed sinus tachycardia with anteroseptal Q waves (unknown chronicity). After discussion with Interventionalist on call (Dr. Troncoso) STEMI code was cancelled. Pt. with markedly elevated systemic BP in the ER with SBP of 180s. Bedside echo shows mild pericardial effusion. Hypertensive Emergency improved on Esmolol which is now off.  Dissection was ruled on CTA.     Family  Discussion Proxy  Yessica Guerra  and other family members  made patient comfort Care  Only with primary Treatment Goal  being  Comfort. Patient very Confused at bedside not answering  commands.        (13 Apr 2021 21:22)    PAST MEDICAL & SURGICAL HISTORY  PAST MEDICAL & SURGICAL HISTORY:  No pertinent past medical history    No significant past surgical history      SOCIAL HISTORY:    ALLERGIES:  No Known Allergies    MEDICATIONS:  STANDING MEDICATIONS  aspirin  chewable 81 milliGRAM(s) Oral daily  atorvastatin 80 milliGRAM(s) Oral at bedtime    PRN MEDICATIONS  morphine  - Injectable 2 milliGRAM(s) IV Push every 4 hours PRN    VITALS:   T(F): 96  HR: 106  BP: 149/90  RR: 18  SpO2: --    LABS:                        12.4   10.60 )-----------( 262      ( 14 Apr 2021 17:30 )             40.2     04-14    140  |  106  |  25<H>  ----------------------------<  136<H>  4.6   |  18  |  0.9    Ca    9.1      14 Apr 2021 17:30  Mg     2.3     04-14    TPro  6.3  /  Alb  3.6  /  TBili  0.5  /  DBili  x   /  AST  201<H>  /  ALT  40  /  AlkPhos  125<H>  04-14    PT/INR - ( 13 Apr 2021 17:00 )   PT: 12.70 sec;   INR: 1.10 ratio         PTT - ( 13 Apr 2021 17:00 )  PTT:37.5 sec          CARDIAC MARKERS ( 13 Apr 2021 17:00 )  x     / 4.79 ng/mL / x     / x     / x          RADIOLOGY:    PHYSICAL EXAM:  GEN: No acute distress  HEENT: AT/NC PEERLA, EOMI  LUNGS: Clear to auscultation bilaterally  HEART: S1/S2 present. RRR.  ABD: Soft, non-tender, non-distended  EXT: No edema, no rashes, no cyanosis  NEURO: AAOX3

## 2021-04-15 NOTE — PROGRESS NOTE ADULT - SUBJECTIVE AND OBJECTIVE BOX
SUBJ: More alerts      MEDICATIONS  (STANDING):  aspirin  chewable 81 milliGRAM(s) Oral daily  atorvastatin 80 milliGRAM(s) Oral at bedtime    MEDICATIONS  (PRN):  morphine  - Injectable 2 milliGRAM(s) IV Push every 4 hours PRN Mild Pain (1 - 3)            Vital Signs Last 24 Hrs  T(C): 36.7 (15 Apr 2021 05:28), Max: 37.1 (15 Apr 2021 00:00)  T(F): 98 (15 Apr 2021 05:28), Max: 98.8 (15 Apr 2021 00:00)  HR: 103 (15 Apr 2021 05:28) (103 - 107)  BP: 171/85 (15 Apr 2021 05:28) (128/81 - 171/85)  BP(mean): --  RR: 18 (15 Apr 2021 05:28) (18 - 18)  SpO2: --     REVIEW OF SYSTEMS:  CONSTITUTIONAL: No fever, weight loss, or fatigue  CARDIOLOGY: PAtient denies chest pain, shortness of breath or syncopal episodes.   RESPIRATORY: denies shortness of breath, wheezeing.   NEUROLOGICAL: NO weakness, no focal deficits to report.  ENDOCRINOLOGICAL: no recent change in diabetic medications.   GI: no BRBPR, no N,V,diarrhea.    PSYCHIATRY: normal mood and affect  HEENT: no nasal discharge, no ecchymosis  SKIN: no ecchymosis, no breakdown  MUSCULOSKELETAL: Full range of motion x4.        PHYSICAL EXAM:  · CONSTITUTIONAL:	Well-developed, well nourished    BMI-  ·RESPIRATORY:   airway patent; breath sounds equal; good air movement; respirations non-labored; clear to auscultation bilaterally; no chest wall tenderness; no intercostal retractions; no rales,rhonchi or wheeze  · CARDIOVASCULAR	regular rate and rhythm  no rub  no murmur  normal PMI  · EXTREMITIES: No cyanosis, clubbing or edema  · VASCULAR: 	Equal and normal pulses (carotid, femoral, dorsalis pedis)  	  TELEMETRY:    ECG:    TTE:    LABS:                        12.4   10.60 )-----------( 262      ( 14 Apr 2021 17:30 )             40.2     04-14    140  |  106  |  25<H>  ----------------------------<  136<H>  4.6   |  18  |  0.9    Ca    9.1      14 Apr 2021 17:30  Mg     2.3     04-14    TPro  6.3  /  Alb  3.6  /  TBili  0.5  /  DBili  x   /  AST  201<H>  /  ALT  40  /  AlkPhos  125<H>  04-14    CARDIAC MARKERS ( 13 Apr 2021 17:00 )  x     / 4.79 ng/mL / x     / x     / x          PT/INR - ( 13 Apr 2021 17:00 )   PT: 12.70 sec;   INR: 1.10 ratio         PTT - ( 13 Apr 2021 17:00 )  PTT:37.5 sec    I&O's Summary    15 Apr 2021 07:01  -  15 Apr 2021 10:31  --------------------------------------------------------  IN: 210 mL / OUT: 0 mL / NET: 210 mL      BNP  RADIOLOGY & ADDITIONAL STUDIES:    IMPRESSION AND PLAN:    Need echo for EF  medical therapy  not candidate for intervention  discussed with team

## 2021-04-16 LAB
ANION GAP SERPL CALC-SCNC: 11 MMOL/L — SIGNIFICANT CHANGE UP (ref 7–14)
BASOPHILS # BLD AUTO: 0.01 K/UL — SIGNIFICANT CHANGE UP (ref 0–0.2)
BASOPHILS NFR BLD AUTO: 0.1 % — SIGNIFICANT CHANGE UP (ref 0–1)
BUN SERPL-MCNC: 20 MG/DL — SIGNIFICANT CHANGE UP (ref 10–20)
CALCIUM SERPL-MCNC: 8.7 MG/DL — SIGNIFICANT CHANGE UP (ref 8.5–10.1)
CHLORIDE SERPL-SCNC: 109 MMOL/L — SIGNIFICANT CHANGE UP (ref 98–110)
CO2 SERPL-SCNC: 24 MMOL/L — SIGNIFICANT CHANGE UP (ref 17–32)
CREAT SERPL-MCNC: 0.8 MG/DL — SIGNIFICANT CHANGE UP (ref 0.7–1.5)
EOSINOPHIL # BLD AUTO: 0.09 K/UL — SIGNIFICANT CHANGE UP (ref 0–0.7)
EOSINOPHIL NFR BLD AUTO: 1.1 % — SIGNIFICANT CHANGE UP (ref 0–8)
GLUCOSE SERPL-MCNC: 144 MG/DL — HIGH (ref 70–99)
HCT VFR BLD CALC: 39.3 % — SIGNIFICANT CHANGE UP (ref 37–47)
HGB BLD-MCNC: 12.2 G/DL — SIGNIFICANT CHANGE UP (ref 12–16)
IMM GRANULOCYTES NFR BLD AUTO: 0.1 % — SIGNIFICANT CHANGE UP (ref 0.1–0.3)
LYMPHOCYTES # BLD AUTO: 1.2 K/UL — SIGNIFICANT CHANGE UP (ref 1.2–3.4)
LYMPHOCYTES # BLD AUTO: 15.1 % — LOW (ref 20.5–51.1)
MAGNESIUM SERPL-MCNC: 2.2 MG/DL — SIGNIFICANT CHANGE UP (ref 1.8–2.4)
MCHC RBC-ENTMCNC: 27.5 PG — SIGNIFICANT CHANGE UP (ref 27–31)
MCHC RBC-ENTMCNC: 31 G/DL — LOW (ref 32–37)
MCV RBC AUTO: 88.5 FL — SIGNIFICANT CHANGE UP (ref 81–99)
MONOCYTES # BLD AUTO: 0.91 K/UL — HIGH (ref 0.1–0.6)
MONOCYTES NFR BLD AUTO: 11.5 % — HIGH (ref 1.7–9.3)
NEUTROPHILS # BLD AUTO: 5.71 K/UL — SIGNIFICANT CHANGE UP (ref 1.4–6.5)
NEUTROPHILS NFR BLD AUTO: 72.1 % — SIGNIFICANT CHANGE UP (ref 42.2–75.2)
NRBC # BLD: 0 /100 WBCS — SIGNIFICANT CHANGE UP (ref 0–0)
PLATELET # BLD AUTO: 220 K/UL — SIGNIFICANT CHANGE UP (ref 130–400)
POTASSIUM SERPL-MCNC: 5.6 MMOL/L — HIGH (ref 3.5–5)
POTASSIUM SERPL-SCNC: 5.6 MMOL/L — HIGH (ref 3.5–5)
RBC # BLD: 4.44 M/UL — SIGNIFICANT CHANGE UP (ref 4.2–5.4)
RBC # FLD: 14.8 % — HIGH (ref 11.5–14.5)
SODIUM SERPL-SCNC: 144 MMOL/L — SIGNIFICANT CHANGE UP (ref 135–146)
WBC # BLD: 7.93 K/UL — SIGNIFICANT CHANGE UP (ref 4.8–10.8)
WBC # FLD AUTO: 7.93 K/UL — SIGNIFICANT CHANGE UP (ref 4.8–10.8)

## 2021-04-16 PROCEDURE — 99233 SBSQ HOSP IP/OBS HIGH 50: CPT

## 2021-04-16 PROCEDURE — 93306 TTE W/DOPPLER COMPLETE: CPT | Mod: 26

## 2021-04-16 PROCEDURE — 99222 1ST HOSP IP/OBS MODERATE 55: CPT

## 2021-04-16 RX ADMIN — ENOXAPARIN SODIUM 40 MILLIGRAM(S): 100 INJECTION SUBCUTANEOUS at 11:12

## 2021-04-16 RX ADMIN — ATORVASTATIN CALCIUM 80 MILLIGRAM(S): 80 TABLET, FILM COATED ORAL at 22:25

## 2021-04-16 RX ADMIN — Medication 81 MILLIGRAM(S): at 11:11

## 2021-04-16 NOTE — CONSULT NOTE ADULT - ASSESSMENT
Assessment: 89 yo F with no PMHx admitted with acute CVA and NSTEMI. EP consulted for ILR to r/o AF    Impression:  Acute CVA  NSTEMI    Plan:  - 2D Echo  - Cont tele monitoring while in hospital  - Spoke with family who want to pursue further investigation of CVA with ILR  - Will plan for ILR prior to discharge  - Will follow Assessment: 89 yo F with no PMHx admitted with acute CVA and NSTEMI. EP consulted for ILR to r/o AF    Impression:  Acute CVA  NSTEMI    Plan:  - 2D Echo  - Cont tele monitoring while in hospital  - Spoke with family who at this time do not want to pursue ILR placement at this time  - Will follow up with them over the weekend

## 2021-04-16 NOTE — CONSULT NOTE ADULT - SUBJECTIVE AND OBJECTIVE BOX
HPI:   89y Female with no known PMHx on no medications who presents from home for AMS, left sided weakness. Per son-in-law, pt spoke with family this morning at 11:00am and told them that she didn't feel well and hadn't slept well overnight, was answering questions appropriately during phone call. He states that last night she did not voice any complaints to family. Family went to house at around 4pm, found pt slumped in chair leaning to the left and called EMS. On arrival to ED, pt was hypertensive (216/168) and tachycardic with labored breathing. Stroke code called PTA. Pt able to provide limited Hx due to clinical condition, denies HA. Initial NIHSS 17, CTH showed early signs of acute infarct of R frontal/R frontoparietal region. Not a tPA candidate as she is out of window. CTP showed area of core infarct without any surrounding salvageable tissue, not a candidate for NI. At baseline pt lives alone and is functionally independent.        ED: STEMI Code was called  evaluated the patient in the ER by cardio fellow. EKG in the ER showed sinus tachycardia with anteroseptal Q waves (unknown chronicity). After discussion with Interventionalist on call (Dr. Troncoso) STEMI code was cancelled. Pt. with markedly elevated systemic BP in the ER with SBP of 180s. Bedside echo shows mild pericardial effusion. Hypertensive Emergency improved on Esmolol which is now off.  Dissection was ruled on CTA.     Family  Discussion Proxy  Yessica Guerra  and other family members  made patient comfort Care  Only with primary Treatment Goal  being  Comfort. Patient very Confused at bedside not answering  commands.             PAST MEDICAL & SURGICAL HISTORY:  No pertinent past medical history    No significant past surgical history        Hospital Course:    TODAY'S SUBJECTIVE & REVIEW OF SYMPTOMS:     Constitutional WNL   Cardio WNL   Resp WNL   GI WNL  Heme WNL  Endo WNL  Skin WNL  MSK WNL  Neuro confused  Cognitive WNL  Psych WNL      MEDICATIONS  (STANDING):  aspirin  chewable 81 milliGRAM(s) Oral daily  atorvastatin 80 milliGRAM(s) Oral at bedtime  enoxaparin Injectable 40 milliGRAM(s) SubCutaneous daily    MEDICATIONS  (PRN):  morphine  - Injectable 2 milliGRAM(s) IV Push every 4 hours PRN Mild Pain (1 - 3)      FAMILY HISTORY:      Allergies    No Known Allergies    Intolerances        SOCIAL HISTORY:    [  ] Etoh  [  ] Smoking  [  ] Substance abuse     Home Environment:  [ x  ] Home Alone  [   ] Lives with Family  [   ] Home Health Aid    Dwelling:  [   ] Apartment  [  x ] Private House  [   ] Adult Home  [   ] Skilled Nursing Facility      [   ] Short Term  [   ] Long Term  [  x ] Stairs       Elevator [   ]    FUNCTIONAL STATUS PTA: (Check all that apply)  Ambulation: [ x   ]Independent    [   ] Dependent     [   ] Non-Ambulatory  Assistive Device: [   ] SA Cane  [   ]  Q Cane  [   ] Walker  [   ]  Wheelchair  ADL : [  x ] Independent  [    ]  Dependent       Vital Signs Last 24 Hrs  T(C): 36.7 (16 Apr 2021 05:30), Max: 36.7 (16 Apr 2021 05:30)  T(F): 98 (16 Apr 2021 05:30), Max: 98 (16 Apr 2021 05:30)  HR: 90 (16 Apr 2021 05:30) (57 - 106)  BP: 138/82 (16 Apr 2021 05:30) (134/77 - 149/90)  BP(mean): --  RR: 18 (16 Apr 2021 05:30) (18 - 18)  SpO2: --      PHYSICAL EXAM: Awake & lethargic  GENERAL: NAD  HEAD:  Normocephalic  CHEST/LUNG: Clear   HEART: S1S2+  ABDOMEN: Soft, Nontender  EXTREMITIES:  no calf tenderness    NERVOUS SYSTEM:  Cranial Nerves 2-12 intact [   ] Abnormal  [   ]  ROM: WFL all extremities [   ]  Abnormal [  x ]limited left side   Motor Strength: WFL all extremities  [   ]  Abnormal [ x  ]limited left side  Sensation: intact to light touch [   ] Abnormal [   ]    FUNCTIONAL STATUS:  Bed Mobility: Independent [   ]  Supervision [   ]  Needs Assistance [ x  ]  N/A [   ]  Transfers: Independent [   ]  Supervision [   ]  Needs Assistance [ x  ]  N/A [   ]   Ambulation: Independent [   ]  Supervision [   ]  Needs Assistance [   ]  N/A [   ]  ADL: Independent [   ] Requires Assistance [   ] N/A [   ]      LABS:                        12.2   7.93  )-----------( 220      ( 16 Apr 2021 07:33 )             39.3     04-16    144  |  109  |  20  ----------------------------<  144<H>  5.6<H>   |  24  |  0.8    Ca    8.7      16 Apr 2021 07:33  Mg     2.2     04-16    TPro  6.3  /  Alb  3.6  /  TBili  0.5  /  DBili  x   /  AST  201<H>  /  ALT  40  /  AlkPhos  125<H>  04-14          RADIOLOGY & ADDITIONAL STUDIES:

## 2021-04-16 NOTE — CONSULT NOTE ADULT - ATTENDING COMMENTS
Patient seen today with neurocritical care team DAISHA Pringle.  I was physically present for the key portions of the evaluation and management (E/M) service provided.  I agree with the above history, physical, and plan with the following additions/modifications     Patient with onset ~6 hours ago of left hemiparesis and neglect, consistent with R MCA syndrome.  Not candidate for tPa, No LVO seen on CTA. CT non-con and perfusion suggestive of distal superior branch infarct, will manage conservatively.  Has concomitant concern for aortic dissection from ED team - given the risks of this worsening it is acceptable to have tight BP control, though this may worsen stroke penumbra perfusion and symptoms, however given these two competing interests, the systemic risk of untreated dissection is of greater concern.      Tj Mosqueda MD  Attending Neurointensivist
cryptogenic stroke  recommend ILR implant;  discussed it with patient's son at bedside.  Family not interested at this time; they will let us know if they want ILR
Patient seen and examined at bedside. GOC discussion with daughter. Patient now only DNR/DNI with medical interventions. Will contiue to follow

## 2021-04-16 NOTE — PROGRESS NOTE ADULT - SUBJECTIVE AND OBJECTIVE BOX
MIQUEL JEFFREY 89y Female  MRN#: [id number]   Hospital Day: 3d    SUBJECTIVE  Patient is a 89y old Female who presents with a chief complaint of AMS (16 Apr 2021 11:13)  Currently admitted to medicine with the primary diagnosis of Stroke      INTERVAL HPI AND OVERNIGHT EVENTS:  Patient was examined and seen at bedside. This morning she is resting comfortably in bed.    REVIEW OF SYMPTOMS:  CONSTITUTIONAL: No weakness, fevers or chills; No headaches  EYES: No visual changes, eye pain, or discharge  ENT: No vertigo; No ear pain or change in hearing; No sore throat or difficulty swallowing  NECK: No pain or stiffness  RESPIRATORY: No cough, wheezing, or hemoptysis; No shortness of breath  CARDIOVASCULAR: No chest pain or palpitations  GASTROINTESTINAL: No abdominal or epigastric pain; No nausea, vomiting, or hematemesis; No diarrhea or constipation; No melena or hematochezia  GENITOURINARY: No dysuria, frequency or hematuria  MUSCULOSKELETAL: No joint pain, no muscle pain, no weakness  NEUROLOGICAL: No numbness or weakness  SKIN: No itching or rashes    OBJECTIVE  PAST MEDICAL & SURGICAL HISTORY  No pertinent past medical history    No significant past surgical history      ALLERGIES:  No Known Allergies    MEDICATIONS:  STANDING MEDICATIONS  aspirin  chewable 81 milliGRAM(s) Oral daily  atorvastatin 80 milliGRAM(s) Oral at bedtime  enoxaparin Injectable 40 milliGRAM(s) SubCutaneous daily    PRN MEDICATIONS  morphine  - Injectable 2 milliGRAM(s) IV Push every 4 hours PRN      VITAL SIGNS: Last 24 Hours  T(C): 36.7 (16 Apr 2021 05:30), Max: 36.7 (16 Apr 2021 05:30)  T(F): 98 (16 Apr 2021 05:30), Max: 98 (16 Apr 2021 05:30)  HR: 90 (16 Apr 2021 05:30) (57 - 106)  BP: 138/82 (16 Apr 2021 05:30) (134/77 - 149/90)  BP(mean): --  RR: 18 (16 Apr 2021 05:30) (18 - 18)  SpO2: --    LABS:                        12.2   7.93  )-----------( 220      ( 16 Apr 2021 07:33 )             39.3     04-16    144  |  109  |  20  ----------------------------<  144<H>  5.6<H>   |  24  |  0.8    Ca    8.7      16 Apr 2021 07:33  Mg     2.2     04-16    TPro  6.3  /  Alb  3.6  /  TBili  0.5  /  DBili  x   /  AST  201<H>  /  ALT  40  /  AlkPhos  125<H>  04-14          Troponin T, Serum: 4.33 ng/mL *HH* (04-15-21 @ 18:26)      CARDIAC MARKERS ( 15 Apr 2021 18:26 )  x     / 4.33 ng/mL / x     / x     / x          RADIOLOGY:  < from: CT Head No Cont (04.15.21 @ 07:25) >  IMPRESSION:    Redemonstration of an evolving right MCA territorial infarct with increased though mild associated cytotoxic edema and minimal midline shift to the left. No evidence of hemorrhagic transformation.    Age-indeterminate left thalamic as well as caudate head lacunar infarcts.    < end of copied text >      PHYSICAL EXAM:  CONSTITUTIONAL: No acute distress, well-developed, AAOx3  HEAD: Atraumatic, normocephalic  EYES: EOMI  ENT: Supple  PULMONARY: Clear to auscultation bilaterally; non labored  CARDIOVASCULAR: Regular rate and rhythm; no murmurs  GASTROINTESTINAL: Soft, non-tender, non-distended;  MUSCULOSKELETAL: Moves 4/4 extremities, no appreciable lower extremity edema  NEUROLOGY: L sided upper extremity weakness compared to R. No obvious strength differences in Lower extremities.  SKIN: Intact    ASSESSMENT & PLAN  89 y/o Female with no known PMHx on no medications who presents from home for AMS, left sided weakness.     # Acute CVA--Unclear etiology  - Non-con CTH (4/14) showed loss of gray-white differentiation with hypodensity in the right frontoparietal region (6/21-24) as well as anteriorly in the right frontal region (5/22-23) consistent with areas of acute infarction--Was not a candidate for tPA or NI on admission  - CT Perfusion (4/14) showed there are areas of hypoperfusion in the right frontal and right posterior parietal region with a total volume of hypoperfusion of 17 mL, with core infarct of 11 ml in the right frontal region. The penumbra volume (mismatch volume) is 6 mL for the right posterior parietal region.  - Repeat CTH: Redemonstration of an evolving right MCA territorial infarct with increased though mild associated cytotoxic edema and minimal midline shift to the left. No evidence of hemorrhagic transformation. Age-indeterminate left thalamic as well as caudate head lacunar infarcts.  - Patient was initially made comfort care in the ED, this AM neurological improvement was noted by family, palliative care had a conversation with the family and they would like to pursue medical management. If worsening they will revert to comfort care.   - Permissive HTN  - Patient has no known medical conditions, does not see physicians,   - f/u lipid profile, Hba1c, TSH with T4 reflex, TTE with bubble. (ordered 4/14 still not yet performed)  - Speech/Swallow following, diet per recs  - Neurology c/s: c/w 81mg ASA, 80mg atorvastatin daily  - Per neuro, if pt found to have Afib, would be OKAY FOR AC  - PT/Physiatry placed (per H&P, lives home alone)  - Discussed with family, want to pursue ILR placement-->EP contacted      # NSTEMI  # Large Pericardial Effusion   # B/l Pericardial Effusion   - Trop 4.79 on admission --> 4.33 after 48 hrs  - Patient is not a candidate for intervention as per cardiology--supportive care only  - Not c/o any chest pain in AM   - Follow up TTE   - Medical management w/ ASA, Plavix  - Cleared for AC by neuro    #Diet: Dysphagia 1 nectar thick   #GI ppx: not indicated   #DVT ppx: hold off for now  #Code status: DNR/DNI, if worsening please call family.   #Dispo: pending TTE, PT, ILR placement    PAST MEDICAL & SURGICAL HISTORY:  No pertinent past medical history    No significant past surgical history

## 2021-04-16 NOTE — PROGRESS NOTE ADULT - SUBJECTIVE AND OBJECTIVE BOX
SUBJ: No new complains      MEDICATIONS  (STANDING):  aspirin  chewable 81 milliGRAM(s) Oral daily  atorvastatin 80 milliGRAM(s) Oral at bedtime  enoxaparin Injectable 40 milliGRAM(s) SubCutaneous daily    MEDICATIONS  (PRN):  morphine  - Injectable 2 milliGRAM(s) IV Push every 4 hours PRN Mild Pain (1 - 3)            Vital Signs Last 24 Hrs  T(C): 36.7 (16 Apr 2021 05:30), Max: 36.7 (16 Apr 2021 05:30)  T(F): 98 (16 Apr 2021 05:30), Max: 98 (16 Apr 2021 05:30)  HR: 90 (16 Apr 2021 05:30) (57 - 106)  BP: 138/82 (16 Apr 2021 05:30) (134/77 - 149/90)  BP(mean): --  RR: 18 (16 Apr 2021 05:30) (18 - 18)  SpO2: --     REVIEW OF SYSTEMS:  UTO      PHYSICAL EXAM:  · CONSTITUTIONAL:	Well-developed, well nourished    BMI-  ·RESPIRATORY:   airway patent; breath sounds equal; good air movement; respirations non-labored; clear to auscultation bilaterally; no chest wall tenderness; no intercostal retractions; no rales,rhonchi or wheeze  · CARDIOVASCULAR	regular rate and rhythm  no rub  no murmur  normal PMI  · EXTREMITIES: No cyanosis, clubbing or edema  · VASCULAR: 	Equal and normal pulses (carotid, femoral, dorsalis pedis)  	  TELEMETRY:    ECG:    TTE:    LABS:                        12.2   7.93  )-----------( 220      ( 16 Apr 2021 07:33 )             39.3     04-16    144  |  109  |  20  ----------------------------<  144<H>  5.6<H>   |  24  |  0.8    Ca    8.7      16 Apr 2021 07:33  Mg     2.2     04-16    TPro  6.3  /  Alb  3.6  /  TBili  0.5  /  DBili  x   /  AST  201<H>  /  ALT  40  /  AlkPhos  125<H>  04-14    CARDIAC MARKERS ( 15 Apr 2021 18:26 )  x     / 4.33 ng/mL / x     / x     / x              I&O's Summary    15 Apr 2021 07:01  -  16 Apr 2021 07:00  --------------------------------------------------------  IN: 330 mL / OUT: 400 mL / NET: -70 mL      BNP  RADIOLOGY & ADDITIONAL STUDIES:    IMPRESSION AND PLAN:    Continue with supportive care  discussed with team

## 2021-04-16 NOTE — CONSULT NOTE ADULT - ASSESSMENT
IMPRESSION: Rehab of cva / left hemiparesis    PRECAUTIONS: [   ] Cardiac  [   ] Respiratory  [   ] Seizures [   ] Contact Isolation  [   ] Droplet Isolation  [   ] Other    Weight Bearing Status:     RECOMMENDATION:    Out of Bed to Chair     DVT/Decubiti Prophylaxis    REHAB PLAN:     [  x  ] Bedside P/T 3-5 times a week   [ x   ]   Bedside O/T  2-3 times a week             [  x  ] Speech Therapy               [    ]  No Rehab Therapy Indicated   Conditioning/ROM                                    ADL  Bed Mobility                                               Conditioning/ROM  Transfers                                                     Bed Mobility  Sitting /Standing Balance                         Transfers                                        Gait Training                                               Sitting/Standing Balance  Stair Training [   ]Applicable                    Home equipment Eval                                                                        Splinting  [   ] Only      GOALS:   ADL   [x    ]   Independent                    Transfers  [  x  ] Independent                          Ambulation  [ x   ] Independent     [  x   ] With device                            [    ]  CG                                                         [    ]  CG                                                                  [    ] CG                            [    ] Min A                                                   [    ] Min A                                                              [    ] Min  A          DISCHARGE PLAN:   [    ]  Good candidate for Intensive Rehabilitation/Hospital based                                             Will tolerate 3hrs Intensive Rehab Daily                                       [  x   ]  Short Term Rehab in Skilled Nursing Facility                                       [     ]  Home with Outpatient or  services                                         [     ]  Possible Candidate for Intensive Hospital based Rehab

## 2021-04-16 NOTE — CONSULT NOTE ADULT - SUBJECTIVE AND OBJECTIVE BOX
Patient is a 89y old  Female who presents with a chief complaint of AMS (2021 11:13)    HPI: Patient is a  89yo Female with no known PMHx on no medications who presents from home for AMS, left sided weakness. Per son-in-law, pt spoke with family this morning at 11:00am and told them that she didn't feel well and hadn't slept well overnight, was answering questions appropriately during phone call. He states that last night she did not voice any complaints to family. Family went to house at around 4pm, found pt slumped in chair leaning to the left and called EMS. On arrival to ED, pt was hypertensive (216/168) and tachycardic with labored breathing. Stroke code called PTA. Pt able to provide limited Hx due to clinical condition, denies HA. Initial NIHSS 17, CTH showed early signs of acute infarct of R frontal/R frontoparietal region. Not a tPA candidate as she is out of window. CTP showed area of core infarct without any surrounding salvageable tissue, not a candidate for NI. At baseline pt lives alone and is functionally independent. STEMI Code was called  evaluated the patient in the ER by cardio fellow. EKG in the ER showed sinus tachycardia with anteroseptal Q waves (unknown chronicity). After discussion with Interventionalist on call (Dr. Troncoso) STEMI code was cancelled. Pt. with markedly elevated systemic BP in the ER with SBP of 180s. Bedside echo shows mild pericardial effusion. Hypertensive Emergency improved on Esmolol which is now off.  Dissection was ruled on CTA. Patient found to have acute R frontoparietal/frontal infarct. No hx of any arrhythmias in the past.    PAST MEDICAL & SURGICAL HISTORY:  No pertinent past medical history    PREVIOUS DIAGNOSTIC TESTING:      ECHO  FINDINGS:    STRESS  FINDINGS:    CATHETERIZATION  FINDINGS:    ELECTROPHYSIOLOGY STUDY  FINDINGS:    CAROTID ULTRASOUND:  FINDINGS    VENOUS DUPLEX SCAN:  FINDINGS:    CHEST CT PULMONARY ANGIO with IV Contrast:  FINDINGS:    MEDICATIONS  (STANDING):  aspirin  chewable 81 milliGRAM(s) Oral daily  atorvastatin 80 milliGRAM(s) Oral at bedtime  enoxaparin Injectable 40 milliGRAM(s) SubCutaneous daily    MEDICATIONS  (PRN):  morphine  - Injectable 2 milliGRAM(s) IV Push every 4 hours PRN Mild Pain (1 - 3)      FAMILY HISTORY: NO significant hx    SOCIAL HISTORY: No smoking, ETOH or illicit drug use    Past Surgical History: No significant hx    Allergies:  No Known Allergies      REVIEW OF SYSTEMS:  CONSTITUTIONAL: No fever, weight loss, chills, shakes, or fatigue  RESPIRATORY: No cough, wheezing, hemoptysis, or shortness of breath  CARDIOVASCULAR: No chest pain, dyspnea, palpitations, dizziness, syncope, paroxysmal nocturnal dyspnea, orthopnea, or arm or leg swelling  GASTROINTESTINAL: No abdominal  or epigastric pain, nausea, vomiting, hematemesis, diarrhea, constipation, melena or bright red blood.  NEUROLOGICAL: No headaches, memory loss, slurred speech, limb weakness, loss of strength, numbness, or tremors  MUSCULOSKELETAL: No joint pain or swelling, muscle, back, or extremity pain      Vital Signs Last 24 Hrs  T(C): 36.7 (2021 05:30), Max: 36.7 (2021 05:30)  T(F): 98 (2021 05:30), Max: 98 (2021 05:30)  HR: 90 (2021 05:30) (57 - 106)  BP: 138/82 (2021 05:30) (134/77 - 149/90)  BP(mean): --  RR: 18 (2021 05:30) (18 - 18)  SpO2: --    PHYSICAL EXAM:  GENERAL: In no apparent distress, well nourished, and hydrated.  HEAD:  Atraumatic, Normocephalic  EYES: EOMI, PERRLA, conjunctiva and sclera clear  ENMT: No tonsillar erythema, exudates, or enlargements; ist mucous membranes, Good dentition, No lesions  NECK: Supple and normal thyroid.  No JVD or carotid bruit.  Carotid pulse is 2+ bilaterally.  HEART: Regular rate and rhythm; No murmurs, rubs, or gallops.  PULMONARY: Clear to auscultation and perfusion.  No rales, wheezing, or rhonchi bilaterally.  ABDOMEN: Soft, Nontender, Nondistended; Bowel sounds present  EXTREMITIES:  2+ Peripheral Pulses, No clubbing, cyanosis, or edema  NEUROLOGICAL: A&Ox3, No focal deficits      INTERPRETATION OF TELEMETRY:    ECG:    I&O's Detail    15 Apr 2021 07:01  -  2021 07:00  --------------------------------------------------------  IN:    Oral Fluid: 330 mL  Total IN: 330 mL    OUT:    Voided (mL): 400 mL  Total OUT: 400 mL    Total NET: -70 mL          LABS:                        12.2   7.93  )-----------( 220      ( 2021 07:33 )             39.3     04-16    144  |  109  |  20  ----------------------------<  144<H>  5.6<H>   |  24  |  0.8    Ca    8.7      2021 07:33  Mg     2.2     04-16    TPro  6.3  /  Alb  3.6  /  TBili  0.5  /  DBili  x   /  AST  201<H>  /  ALT  40  /  AlkPhos  125<H>  04-14    CARDIAC MARKERS ( 15 Apr 2021 18:26 )  x     / 4.33 ng/mL / x     / x     / x              BNP  I&O's Detail    15 Apr 2021 07:01  -  2021 07:00  --------------------------------------------------------  IN:    Oral Fluid: 330 mL  Total IN: 330 mL    OUT:    Voided (mL): 400 mL  Total OUT: 400 mL    Total NET: -70 mL        Daily     Daily Weight in k (2021 05:30)    RADIOLOGY & ADDITIONAL STUDIES: Patient is a 89y old  Female who presents with a chief complaint of AMS (2021 11:13)    HPI: Patient is a  89yo Female with no known PMHx on no medications who presents from home for AMS, left sided weakness. Per son-in-law, pt spoke with family this morning at 11:00am and told them that she didn't feel well and hadn't slept well overnight, was answering questions appropriately during phone call. He states that last night she did not voice any complaints to family. Family went to house at around 4pm, found pt slumped in chair leaning to the left and called EMS. On arrival to ED, pt was hypertensive (216/168) and tachycardic with labored breathing. Stroke code called PTA. Pt able to provide limited Hx due to clinical condition, denies HA. Initial NIHSS 17, CTH showed early signs of acute infarct of R frontal/R frontoparietal region. Not a tPA candidate as she is out of window. CTP showed area of core infarct without any surrounding salvageable tissue, not a candidate for NI. At baseline pt lives alone and is functionally independent. STEMI Code was called  evaluated the patient in the ER by cardio fellow. EKG in the ER showed sinus tachycardia with anteroseptal Q waves (unknown chronicity). After discussion with Interventionalist on call (Dr. Troncoso) STEMI code was cancelled. Pt. with markedly elevated systemic BP in the ER with SBP of 180s. Bedside echo shows mild pericardial effusion. Hypertensive Emergency improved on Esmolol which is now off.  Dissection was ruled on CTA. Patient found to have acute R frontoparietal/frontal infarct. No hx of any arrhythmias in the past.    PAST MEDICAL & SURGICAL HISTORY:  No pertinent past medical history    PREVIOUS DIAGNOSTIC TESTING:      ECHO  FINDINGS:    STRESS  FINDINGS:    CATHETERIZATION  FINDINGS:    ELECTROPHYSIOLOGY STUDY  FINDINGS:    CAROTID ULTRASOUND:  FINDINGS    VENOUS DUPLEX SCAN:  FINDINGS:    CHEST CT PULMONARY ANGIO with IV Contrast:  FINDINGS:    MEDICATIONS  (STANDING):  aspirin  chewable 81 milliGRAM(s) Oral daily  atorvastatin 80 milliGRAM(s) Oral at bedtime  enoxaparin Injectable 40 milliGRAM(s) SubCutaneous daily    MEDICATIONS  (PRN):  morphine  - Injectable 2 milliGRAM(s) IV Push every 4 hours PRN Mild Pain (1 - 3)      FAMILY HISTORY: NO significant hx    SOCIAL HISTORY: No smoking, ETOH or illicit drug use    Past Surgical History: No significant hx    Allergies:  No Known Allergies      REVIEW OF SYSTEMS:  CONSTITUTIONAL: No fever, weight loss, chills, shakes, or fatigue  RESPIRATORY: No cough, wheezing, hemoptysis, or shortness of breath  CARDIOVASCULAR: No chest pain, dyspnea, palpitations, dizziness, syncope, paroxysmal nocturnal dyspnea, orthopnea, or arm or leg swelling  GASTROINTESTINAL: No abdominal  or epigastric pain, nausea, vomiting, hematemesis, diarrhea, constipation, melena or bright red blood.  NEUROLOGICAL: No headaches, memory loss, slurred speech, limb weakness, loss of strength, numbness, or tremors  MUSCULOSKELETAL: No joint pain or swelling, muscle, back, or extremity pain      Vital Signs Last 24 Hrs  T(C): 36.7 (2021 05:30), Max: 36.7 (2021 05:30)  T(F): 98 (2021 05:30), Max: 98 (2021 05:30)  HR: 90 (2021 05:30) (57 - 106)  BP: 138/82 (2021 05:30) (134/77 - 149/90)  BP(mean): --  RR: 18 (2021 05:30) (18 - 18)  SpO2: --    PHYSICAL EXAM:  GENERAL: In no apparent distress, well nourished, and hydrated.  HEAD:  Atraumatic, Normocephalic  EYES: EOMI, PERRLA, conjunctiva and sclera clear  ENMT: No tonsillar erythema, exudates, or enlargements; ist mucous membranes, Good dentition, No lesions  NECK: Supple and normal thyroid.  No JVD or carotid bruit.  Carotid pulse is 2+ bilaterally.  HEART: Regular rate and rhythm; No murmurs, rubs, or gallops.  PULMONARY: Clear to auscultation and perfusion.  No rales, wheezing, or rhonchi bilaterally.  ABDOMEN: Soft, Nontender, Nondistended; Bowel sounds present  EXTREMITIES:  2+ Peripheral Pulses, No clubbing, cyanosis, or edema  NEUROLOGICAL: A&Ox3, No focal deficits      INTERPRETATION OF TELEMETRY: NSR 95 bpm    ECG:  < from: 12 Lead ECG (21 @ 17:36) >  Ventricular Rate 120 BPM    Atrial Rate 120 BPM    P-R Interval 130 ms    QRS Duration 80 ms    Q-T Interval 356 ms    QTC Calculation(Bazett) 503 ms    P Axis 52 degrees    R Axis 92 degrees    T Axis -20 degrees    Diagnosis Line Sinus tachycardia  Rightward axis  Possible Inferior infarct , age undetermined  Anteroseptal infarct , age undetermined  Abnormal ECG    Confirmed by Aroldo Alex ([id number]) on 2021 6:34:00 PM    < end of copied text >      I&O's Detail    15 Apr 2021 07:01  -  2021 07:00  --------------------------------------------------------  IN:    Oral Fluid: 330 mL  Total IN: 330 mL    OUT:    Voided (mL): 400 mL  Total OUT: 400 mL    Total NET: -70 mL          LABS:                        12.2   7.93  )-----------( 220      ( 2021 07:33 )             39.3     04-16    144  |  109  |  20  ----------------------------<  144<H>  5.6<H>   |  24  |  0.8    Ca    8.7      2021 07:33  Mg     2.2     04-16    TPro  6.3  /  Alb  3.6  /  TBili  0.5  /  DBili  x   /  AST  201<H>  /  ALT  40  /  AlkPhos  125<H>  04-14    CARDIAC MARKERS ( 15 Apr 2021 18:26 )  x     / 4.33 ng/mL / x     / x     / x              BNP  I&O's Detail    15 Apr 2021 07:01  -  2021 07:00  --------------------------------------------------------  IN:    Oral Fluid: 330 mL  Total IN: 330 mL    OUT:    Voided (mL): 400 mL  Total OUT: 400 mL    Total NET: -70 mL        Daily     Daily Weight in k (2021 05:30)    RADIOLOGY & ADDITIONAL STUDIES:

## 2021-04-17 LAB
ALBUMIN SERPL ELPH-MCNC: 3.5 G/DL — SIGNIFICANT CHANGE UP (ref 3.5–5.2)
ALP SERPL-CCNC: 106 U/L — SIGNIFICANT CHANGE UP (ref 30–115)
ALT FLD-CCNC: 15 U/L — SIGNIFICANT CHANGE UP (ref 0–41)
ANION GAP SERPL CALC-SCNC: 15 MMOL/L — HIGH (ref 7–14)
AST SERPL-CCNC: 40 U/L — SIGNIFICANT CHANGE UP (ref 0–41)
BILIRUB SERPL-MCNC: 1.2 MG/DL — SIGNIFICANT CHANGE UP (ref 0.2–1.2)
BLD GP AB SCN SERPL QL: SIGNIFICANT CHANGE UP
BUN SERPL-MCNC: 17 MG/DL — SIGNIFICANT CHANGE UP (ref 10–20)
CALCIUM SERPL-MCNC: 8.9 MG/DL — SIGNIFICANT CHANGE UP (ref 8.5–10.1)
CHLORIDE SERPL-SCNC: 106 MMOL/L — SIGNIFICANT CHANGE UP (ref 98–110)
CO2 SERPL-SCNC: 24 MMOL/L — SIGNIFICANT CHANGE UP (ref 17–32)
CREAT SERPL-MCNC: 0.8 MG/DL — SIGNIFICANT CHANGE UP (ref 0.7–1.5)
GLUCOSE SERPL-MCNC: 127 MG/DL — HIGH (ref 70–99)
HCT VFR BLD CALC: 45 % — SIGNIFICANT CHANGE UP (ref 37–47)
HGB BLD-MCNC: 13.8 G/DL — SIGNIFICANT CHANGE UP (ref 12–16)
INR BLD: 1.18 RATIO — SIGNIFICANT CHANGE UP (ref 0.65–1.3)
MAGNESIUM SERPL-MCNC: 2.2 MG/DL — SIGNIFICANT CHANGE UP (ref 1.8–2.4)
MCHC RBC-ENTMCNC: 27.1 PG — SIGNIFICANT CHANGE UP (ref 27–31)
MCHC RBC-ENTMCNC: 30.7 G/DL — LOW (ref 32–37)
MCV RBC AUTO: 88.4 FL — SIGNIFICANT CHANGE UP (ref 81–99)
NRBC # BLD: 0 /100 WBCS — SIGNIFICANT CHANGE UP (ref 0–0)
PLATELET # BLD AUTO: 228 K/UL — SIGNIFICANT CHANGE UP (ref 130–400)
POTASSIUM SERPL-MCNC: 4.5 MMOL/L — SIGNIFICANT CHANGE UP (ref 3.5–5)
POTASSIUM SERPL-SCNC: 4.5 MMOL/L — SIGNIFICANT CHANGE UP (ref 3.5–5)
PROT SERPL-MCNC: 6.3 G/DL — SIGNIFICANT CHANGE UP (ref 6–8)
PROTHROM AB SERPL-ACNC: 13.6 SEC — HIGH (ref 9.95–12.87)
RBC # BLD: 5.09 M/UL — SIGNIFICANT CHANGE UP (ref 4.2–5.4)
RBC # FLD: 14.6 % — HIGH (ref 11.5–14.5)
SODIUM SERPL-SCNC: 145 MMOL/L — SIGNIFICANT CHANGE UP (ref 135–146)
WBC # BLD: 9.6 K/UL — SIGNIFICANT CHANGE UP (ref 4.8–10.8)
WBC # FLD AUTO: 9.6 K/UL — SIGNIFICANT CHANGE UP (ref 4.8–10.8)

## 2021-04-17 PROCEDURE — 99233 SBSQ HOSP IP/OBS HIGH 50: CPT

## 2021-04-17 RX ORDER — CARVEDILOL PHOSPHATE 80 MG/1
3.12 CAPSULE, EXTENDED RELEASE ORAL EVERY 12 HOURS
Refills: 0 | Status: DISCONTINUED | OUTPATIENT
Start: 2021-04-17 | End: 2021-04-21

## 2021-04-17 RX ORDER — SODIUM CHLORIDE 9 MG/ML
1000 INJECTION, SOLUTION INTRAVENOUS
Refills: 0 | Status: DISCONTINUED | OUTPATIENT
Start: 2021-04-17 | End: 2021-04-17

## 2021-04-17 RX ADMIN — ENOXAPARIN SODIUM 40 MILLIGRAM(S): 100 INJECTION SUBCUTANEOUS at 11:03

## 2021-04-17 RX ADMIN — SODIUM CHLORIDE 75 MILLILITER(S): 9 INJECTION, SOLUTION INTRAVENOUS at 12:56

## 2021-04-17 RX ADMIN — Medication 81 MILLIGRAM(S): at 11:03

## 2021-04-17 NOTE — OCCUPATIONAL THERAPY INITIAL EVALUATION ADULT - BALANCE DISTURBANCE, IDENTIFIED IMPAIRMENT CONTRIBUTE, REHAB EVAL
impaired coordination/impaired motor control/abnormal muscle tone/impaired postural control/decreased sensation

## 2021-04-17 NOTE — PHYSICAL THERAPY INITIAL EVALUATION ADULT - GENERAL OBSERVATIONS, REHAB EVAL
PT IE attempt 2:30pm. Activity orders updated. Patient currently off unit for an echo test. Will follow up at another time.
PT unable to evaluate patient because bed rest orders are still active. Activity orders need to be updated prior to IE. Will follow up at another time.
08:20-08:54 Pt encountered semifowler in bed with tele, IV lock, prima fit, O2 NC, in NAD. Pt lethargic, but arouses to name. Pt without c/o, denies pain. BP: 155/94, HR: 97bpm. RN Taryn aware. Pt left same as found with tele, IV lock, prima fit, O2 NC, in NAD

## 2021-04-17 NOTE — PROGRESS NOTE ADULT - SUBJECTIVE AND OBJECTIVE BOX
SUBJ: No new complains      MEDICATIONS  (STANDING):  aspirin  chewable 81 milliGRAM(s) Oral daily  atorvastatin 80 milliGRAM(s) Oral at bedtime  carvedilol 3.125 milliGRAM(s) Oral every 12 hours  enoxaparin Injectable 40 milliGRAM(s) SubCutaneous daily    MEDICATIONS  (PRN):            Vital Signs Last 24 Hrs  T(C): 36.7 (17 Apr 2021 13:00), Max: 37.1 (16 Apr 2021 20:26)  T(F): 98.1 (17 Apr 2021 13:00), Max: 98.7 (16 Apr 2021 20:26)  HR: 88 (17 Apr 2021 13:00) (88 - 111)  BP: 116/71 (17 Apr 2021 13:00) (116/71 - 153/96)  BP(mean): --  RR: 18 (17 Apr 2021 13:00) (18 - 18)  SpO2: --     REVIEW OF SYSTEMS:      PHYSICAL EXAM:  · CONSTITUTIONAL:	Well-developed, well nourished    BMI-  ·RESPIRATORY:   airway patent; breath sounds equal; good air movement; respirations non-labored; clear to auscultation bilaterally; no chest wall tenderness; no intercostal retractions; no rales,rhonchi or wheeze  · CARDIOVASCULAR	regular rate and rhythm  no rub  no murmur  normal PMI  · EXTREMITIES: No cyanosis, clubbing or edema  · VASCULAR: 	Equal and normal pulses (carotid, femoral, dorsalis pedis)  	  TELEMETRY:    ECG:    TTE:    LABS:                        13.8   9.60  )-----------( 228      ( 17 Apr 2021 08:56 )             45.0     04-17    145  |  106  |  17  ----------------------------<  127<H>  4.5   |  24  |  0.8    Ca    8.9      17 Apr 2021 08:56  Mg     2.2     04-17    TPro  6.3  /  Alb  3.5  /  TBili  1.2  /  DBili  x   /  AST  40  /  ALT  15  /  AlkPhos  106  04-17    CARDIAC MARKERS ( 15 Apr 2021 18:26 )  x     / 4.33 ng/mL / x     / x     / x          PT/INR - ( 17 Apr 2021 08:56 )   PT: 13.60 sec;   INR: 1.18 ratio             I&O's Summary    16 Apr 2021 07:01  -  17 Apr 2021 07:00  --------------------------------------------------------  IN: 100 mL / OUT: 700 mL / NET: -600 mL    17 Apr 2021 07:01  -  17 Apr 2021 15:23  --------------------------------------------------------  IN: 75 mL / OUT: 200 mL / NET: -125 mL      BNP  RADIOLOGY & ADDITIONAL STUDIES:    IMPRESSION AND PLAN:    severe Lv dysfunction  DC tele  add low dose metoprolol and ACE inh if BP tolerates

## 2021-04-17 NOTE — OCCUPATIONAL THERAPY INITIAL EVALUATION ADULT - VISUAL ACUITY
Pt's eyes crusted over, cleaned, and pt remain unable to open eyes independently.  By end of IE, Pt demo minimal Right eye opening, left remain unopen.  Pt fully aroused and verbal.. Unable to fully assess vision at this time.

## 2021-04-17 NOTE — OCCUPATIONAL THERAPY INITIAL EVALUATION ADULT - PERTINENT HX OF CURRENT PROBLEM, REHAB EVAL
89y/o right handed female ADM with change in mental status & left sided weakness. Pt ruled in for stroke.

## 2021-04-17 NOTE — PHYSICAL THERAPY INITIAL EVALUATION ADULT - PERTINENT HX OF CURRENT PROBLEM, REHAB EVAL
Pt ADM with change in mental status & left sided weakness. Pt ruled in for stroke. Pt is a 89y/o right handed female ADM with change in mental status & left sided weakness. Pt ruled in for stroke.

## 2021-04-17 NOTE — PROGRESS NOTE ADULT - SUBJECTIVE AND OBJECTIVE BOX
MIQUEL JEFFREY    Patient is a 89y old  Female who presents with a chief complaint of AMS (16 Apr 2021 12:29)    INTERVAL HPI/OVERNIGHT EVENTS: no events overnight, pt remains lethargic, but responds to name easily, very weak, not able to get up from bed.     ROS: All ROS negative     PHYSICAL EXAM:  T(C): 36.7, Max: 37.1 (04-16-21 @ 20:26)  HR: 88 (88 - 111)  BP: 116/71 (116/71 - 153/96)  RR: 18 (18 - 18)  SpO2: --    GENERAL: NAD, frail  PULMONARY/CHEST: No rales, rhonchi, or wheezing  CARDIOVASC: Regular rate and rhythm; No murmurs  GI/ABDOMEN: Soft, Nontender, Nondistended; Bowel sounds present  EXTREMITIES:  no edema, no calf tenderness b/l. +severe onychomycosis and toes deformation.  NERVOUS SYSTEM: awake, responds to her name, answers questions, cranial nerves grossly intact, motor left UE 4/5, legs b/l 4/5    LABS:                        13.8   9.60  )-----------( 228      ( 17 Apr 2021 08:56 )             45.0     04-17    145  |  106  |  17  ----------------------------<  127<H>  4.5   |  24  |  0.8    Ca    8.9      17 Apr 2021 08:56  Mg     2.2     04-17    TPro  6.3  /  Alb  3.5  /  TBili  1.2  /  DBili  x   /  AST  40  /  ALT  15  /  AlkPhos  106  04-17    PT/INR - ( 17 Apr 2021 08:56 )   PT: 13.60 sec;   INR: 1.18 ratio      < from: TTE Echo Complete w/o Contrast w/ Doppler (04.16.21 @ 14:28) >  Summary:   1. Severely decreased global left ventricular systolic function.   2. Multiple left ventricular regional wall motion abnormalities exist. See wall motion findings.   3. LV Ejection Fraction by Zayas's Method with a biplane EF of 32 %.   4. Mild concentric left ventricular hypertrophy.   5. Mildly increased LV wall thickness.   6. The left ventricular diastolic function could not be assessed in this study.   7. Moderately enlarged left atrium.   8. Normal right atrial size.   9. Small pericardial effusion.  10.Mild mitral valve regurgitation.  11. Mild tricuspid regurgitation.  12. Color flow doppler and intravenous injection of agitated saline demonstrates the presence of an intact intra atrial septum.  13. LA volume Index is 41.3 ml/m² ml/m2.  14. Peak transaortic gradient equals 47.7 mmHg, mean transaortic gradient equals 28.4 mmHg, the calculated aortic valve area equals 0.62 cm² by the continuity equation consistent with moderate aortic stenosis.    < end of copied text >      MEDICATIONS  (STANDING):  aspirin  chewable 81 milliGRAM(s) Oral daily  atorvastatin 80 milliGRAM(s) Oral at bedtime  carvedilol 3.125 milliGRAM(s) Oral every 12 hours  enoxaparin Injectable 40 milliGRAM(s) SubCutaneous daily    MEDICATIONS  (PRN):

## 2021-04-17 NOTE — PHYSICAL THERAPY INITIAL EVALUATION ADULT - IMPAIRMENTS FOUND, PT EVAL
aerobic capacity/endurance/arousal, attention, and cognition/gait, locomotion, and balance/muscle strength/posture

## 2021-04-17 NOTE — PROGRESS NOTE ADULT - ASSESSMENT
89 yo F with no MHx (pt was not following with doctors and not on meds) presented with AMS and left side weakness.    # NSTEMI - supportive management as per cardio. Pt is not candidate for invasive interventions. Repeat Trop stable, not trending up. Cont ASA and Statin, can add BB, start with low dose.  # Ischemic cardiomyopathy, HFrEF, EF 32%.  - echo reviewed, full results above  - pt looks euvolemic  - dc IVF  - high risk for unstable arrhythmias, but daughter does not want life vest ot ICD    # Acute CVA, likely cardioembolic   - CTH: right MCA territorial infarct with increased though mild associated cytotoxic edema and minimal midline shift to the left. No evidence of hemorrhagic transformation.  - neurological deficit resolved. Neurology okay to start AC if needed. SLP advised on diet. PT/OT eval. Spoke to daughter about loop recorder, she is not sure if she wants it. Daughter wants pt to be discharged in rehab.   - patent foramen ovale  - HbA1C 6.2  - lipid profile noted    # Pericardial effusion small, no interventions.    DVT ppx Lovenox    DNR/DNI    Spoke to daughter, discussed ECHO findings, discussed further paln of care.     DC planning to STR

## 2021-04-17 NOTE — OCCUPATIONAL THERAPY INITIAL EVALUATION ADULT - IMPAIRMENTS CONTRIBUTING IMPAIRED BED MOBILITY, REHAB EVAL
impaired balance/cognition/decreased flexibility/impaired motor control/abnormal muscle tone/impaired postural control/decreased sensation

## 2021-04-18 LAB
ALBUMIN SERPL ELPH-MCNC: 3 G/DL — LOW (ref 3.5–5.2)
ALP SERPL-CCNC: 87 U/L — SIGNIFICANT CHANGE UP (ref 30–115)
ALT FLD-CCNC: 11 U/L — SIGNIFICANT CHANGE UP (ref 0–41)
ANION GAP SERPL CALC-SCNC: 16 MMOL/L — HIGH (ref 7–14)
AST SERPL-CCNC: 28 U/L — SIGNIFICANT CHANGE UP (ref 0–41)
BILIRUB SERPL-MCNC: 0.7 MG/DL — SIGNIFICANT CHANGE UP (ref 0.2–1.2)
BUN SERPL-MCNC: 21 MG/DL — HIGH (ref 10–20)
CALCIUM SERPL-MCNC: 8.6 MG/DL — SIGNIFICANT CHANGE UP (ref 8.5–10.1)
CHLORIDE SERPL-SCNC: 108 MMOL/L — SIGNIFICANT CHANGE UP (ref 98–110)
CO2 SERPL-SCNC: 22 MMOL/L — SIGNIFICANT CHANGE UP (ref 17–32)
CREAT SERPL-MCNC: 0.7 MG/DL — SIGNIFICANT CHANGE UP (ref 0.7–1.5)
GLUCOSE SERPL-MCNC: 116 MG/DL — HIGH (ref 70–99)
HCT VFR BLD CALC: 39.5 % — SIGNIFICANT CHANGE UP (ref 37–47)
HGB BLD-MCNC: 12.3 G/DL — SIGNIFICANT CHANGE UP (ref 12–16)
MAGNESIUM SERPL-MCNC: 2.3 MG/DL — SIGNIFICANT CHANGE UP (ref 1.8–2.4)
MCHC RBC-ENTMCNC: 27.5 PG — SIGNIFICANT CHANGE UP (ref 27–31)
MCHC RBC-ENTMCNC: 31.1 G/DL — LOW (ref 32–37)
MCV RBC AUTO: 88.2 FL — SIGNIFICANT CHANGE UP (ref 81–99)
NRBC # BLD: 0 /100 WBCS — SIGNIFICANT CHANGE UP (ref 0–0)
PLATELET # BLD AUTO: 226 K/UL — SIGNIFICANT CHANGE UP (ref 130–400)
POTASSIUM SERPL-MCNC: 4.4 MMOL/L — SIGNIFICANT CHANGE UP (ref 3.5–5)
POTASSIUM SERPL-SCNC: 4.4 MMOL/L — SIGNIFICANT CHANGE UP (ref 3.5–5)
PROT SERPL-MCNC: 5.5 G/DL — LOW (ref 6–8)
RBC # BLD: 4.48 M/UL — SIGNIFICANT CHANGE UP (ref 4.2–5.4)
RBC # FLD: 14.6 % — HIGH (ref 11.5–14.5)
SODIUM SERPL-SCNC: 146 MMOL/L — SIGNIFICANT CHANGE UP (ref 135–146)
WBC # BLD: 9.21 K/UL — SIGNIFICANT CHANGE UP (ref 4.8–10.8)
WBC # FLD AUTO: 9.21 K/UL — SIGNIFICANT CHANGE UP (ref 4.8–10.8)

## 2021-04-18 PROCEDURE — 99232 SBSQ HOSP IP/OBS MODERATE 35: CPT

## 2021-04-18 RX ORDER — SODIUM CHLORIDE 9 MG/ML
1000 INJECTION, SOLUTION INTRAVENOUS
Refills: 0 | Status: DISCONTINUED | OUTPATIENT
Start: 2021-04-18 | End: 2021-04-20

## 2021-04-18 RX ADMIN — ENOXAPARIN SODIUM 40 MILLIGRAM(S): 100 INJECTION SUBCUTANEOUS at 11:09

## 2021-04-18 RX ADMIN — SODIUM CHLORIDE 50 MILLILITER(S): 9 INJECTION, SOLUTION INTRAVENOUS at 15:23

## 2021-04-18 NOTE — PROGRESS NOTE ADULT - SUBJECTIVE AND OBJECTIVE BOX
MIQUEL JEFFREY 89y Female  MRN#: [id number]   Hospital Day: 5d    SUBJECTIVE  Patient is a 89y old Female who presents with a chief complaint of AMS (17 Apr 2021 15:22)  Currently admitted to medicine with the primary diagnosis of Stroke      INTERVAL HPI AND OVERNIGHT EVENTS:  Patient was examined and seen at bedside. This morning she is resting comfortably in bed.    REVIEW OF SYMPTOMS:  CONSTITUTIONAL: No weakness, fevers or chills; No headaches  EYES: No visual changes, eye pain, or discharge  ENT: No vertigo; No ear pain or change in hearing; No sore throat or difficulty swallowing  NECK: No pain or stiffness  RESPIRATORY: No cough, wheezing, or hemoptysis; No shortness of breath  CARDIOVASCULAR: No chest pain or palpitations  GASTROINTESTINAL: No abdominal or epigastric pain; No nausea, vomiting, or hematemesis; No diarrhea or constipation; No melena or hematochezia  GENITOURINARY: No dysuria, frequency or hematuria  MUSCULOSKELETAL: No joint pain, no muscle pain, no weakness  NEUROLOGICAL: No numbness or weakness  SKIN: No itching or rashes    OBJECTIVE  PAST MEDICAL & SURGICAL HISTORY  No pertinent past medical history    No significant past surgical history      ALLERGIES:  No Known Allergies    MEDICATIONS:  STANDING MEDICATIONS  aspirin  chewable 81 milliGRAM(s) Oral daily  atorvastatin 80 milliGRAM(s) Oral at bedtime  carvedilol 3.125 milliGRAM(s) Oral every 12 hours  enoxaparin Injectable 40 milliGRAM(s) SubCutaneous daily  lactated ringers. 1000 milliLiter(s) IV Continuous <Continuous>    PRN MEDICATIONS      VITAL SIGNS: Last 24 Hours  T(C): 36.6 (18 Apr 2021 13:55), Max: 36.7 (17 Apr 2021 20:45)  T(F): 97.8 (18 Apr 2021 13:55), Max: 98 (17 Apr 2021 20:45)  HR: 96 (18 Apr 2021 13:55) (81 - 96)  BP: 130/62 (18 Apr 2021 13:55) (130/62 - 133/79)  BP(mean): --  RR: 18 (18 Apr 2021 13:55) (18 - 18)  SpO2: --    LABS:                        12.3   9.21  )-----------( 226      ( 18 Apr 2021 06:18 )             39.5     04-18    146  |  108  |  21<H>  ----------------------------<  116<H>  4.4   |  22  |  0.7    Ca    8.6      18 Apr 2021 06:18  Mg     2.3     04-18    TPro  5.5<L>  /  Alb  3.0<L>  /  TBili  0.7  /  DBili  x   /  AST  28  /  ALT  11  /  AlkPhos  87  04-18    PT/INR - ( 17 Apr 2021 08:56 )   PT: 13.60 sec;   INR: 1.18 ratio                       RADIOLOGY:      PHYSICAL EXAM:      ASSESSMENT & PLAN      PAST MEDICAL & SURGICAL HISTORY:  No pertinent past medical history    No significant past surgical history     MIQUEL JEFFREY 89y Female  MRN#: [id number]   Hospital Day: 5d    SUBJECTIVE  Patient is a 89y old Female who presents with a chief complaint of AMS (17 Apr 2021 15:22)  Currently admitted to medicine with the primary diagnosis of Stroke      INTERVAL HPI AND OVERNIGHT EVENTS:  Patient was examined and seen at bedside. This morning she is resting comfortably in bed.      REVIEW OF SYMPTOMS:  Denies any pain or sob. +weakness on L side    OBJECTIVE  PAST MEDICAL & SURGICAL HISTORY  No pertinent past medical history    No significant past surgical history      ALLERGIES:  No Known Allergies    MEDICATIONS:  STANDING MEDICATIONS  aspirin  chewable 81 milliGRAM(s) Oral daily  atorvastatin 80 milliGRAM(s) Oral at bedtime  carvedilol 3.125 milliGRAM(s) Oral every 12 hours  enoxaparin Injectable 40 milliGRAM(s) SubCutaneous daily  lactated ringers. 1000 milliLiter(s) IV Continuous <Continuous>    PRN MEDICATIONS      VITAL SIGNS: Last 24 Hours  T(C): 36.6 (18 Apr 2021 13:55), Max: 36.7 (17 Apr 2021 20:45)  T(F): 97.8 (18 Apr 2021 13:55), Max: 98 (17 Apr 2021 20:45)  HR: 96 (18 Apr 2021 13:55) (81 - 96)  BP: 130/62 (18 Apr 2021 13:55) (130/62 - 133/79)  BP(mean): --  RR: 18 (18 Apr 2021 13:55) (18 - 18)  SpO2: --    LABS:                        12.3   9.21  )-----------( 226      ( 18 Apr 2021 06:18 )             39.5     04-18    146  |  108  |  21<H>  ----------------------------<  116<H>  4.4   |  22  |  0.7    Ca    8.6      18 Apr 2021 06:18  Mg     2.3     04-18    TPro  5.5<L>  /  Alb  3.0<L>  /  TBili  0.7  /  DBili  x   /  AST  28  /  ALT  11  /  AlkPhos  87  04-18    PT/INR - ( 17 Apr 2021 08:56 )   PT: 13.60 sec;   INR: 1.18 ratio          RADIOLOGY:      PHYSICAL EXAM:  CONSTITUTIONAL: No acute distress, well-developed, AAOx3  HEAD: Atraumatic, normocephalic  EYES: EOMI  ENT: Supple  PULMONARY: Clear to auscultation bilaterally; non labored  CARDIOVASCULAR: Regular rate and rhythm; no murmurs  GASTROINTESTINAL: Soft, non-tender, non-distended;  MUSCULOSKELETAL: Moves 4/4 extremities, no appreciable lower extremity edema  NEUROLOGY: lethargic but arousable. L sided upper extremity weakness compared to R. No obvious strength differences in Lower extremities.  SKIN: Intact    ASSESSMENT & PLAN  89 y/o Female with no known PMHx on no medications who presents from home for AMS, left sided weakness.     # Acute CVA--Unclear etiology  - Non-con CTH (4/14) showed loss of gray-white differentiation with hypodensity in the right frontoparietal region (6/21-24) as well as anteriorly in the right frontal region (5/22-23) consistent with areas of acute infarction--Was not a candidate for tPA or NI on admission  - CT Perfusion (4/14) showed there are areas of hypoperfusion in the right frontal and right posterior parietal region with a total volume of hypoperfusion of 17 mL, with core infarct of 11 ml in the right frontal region. The penumbra volume (mismatch volume) is 6 mL for the right posterior parietal region.  - Repeat CTH: Redemonstration of an evolving right MCA territorial infarct with increased though mild associated cytotoxic edema and minimal midline shift to the left. No evidence of hemorrhagic transformation. Age-indeterminate left thalamic as well as caudate head lacunar infarcts.  - Patient was initially made comfort care in the ED, this AM neurological improvement was noted by family, palliative care had a conversation with the family and they would like to pursue medical management. If worsening they will revert to comfort care.   - Permissive HTN  - Patient has no known medical conditions, does not see physicians,   - f/u lipid profile, Hba1c, TSH with T4 reflex, TTE with bubble. (ordered 4/14 still not yet performed)  - Speech/Swallow following, diet per recs  - Neurology c/s: c/w 81mg ASA, 80mg atorvastatin daily  - Per neuro, if pt found to have Afib, would be OKAY FOR AC  - PT/Physiatry--recommending SNF on d/c  - Family initially wanted to pursue ILR placement but have since reconsidered. EP following and will check in with family      # NSTEMI  # Large Pericardial Effusion   # B/l Pericardial Effusion   - Trop 4.79 on admission --> 4.33 after 48 hrs  - Patient is not a candidate for intervention as per cardiology--supportive care only  - Not c/o any chest pain in AM   - Follow up TTE   - Medical management w/ ASA, Plavix  - Cleared for AC by neuro    #Diet: Dysphagia 1 nectar thick   #GI ppx: not indicated   #DVT ppx: hold off for now  #Code status: DNR/DNI, if worsening please call family.   #Dispo: pending TTE, PT, ILR placement          PAST MEDICAL & SURGICAL HISTORY:  No pertinent past medical history    No significant past surgical history     MIQUEL JEFFREY 89y Female  MRN#: [id number]   Hospital Day: 5d    SUBJECTIVE  Patient is a 89y old Female who presents with a chief complaint of AMS (17 Apr 2021 15:22)  Currently admitted to medicine with the primary diagnosis of Stroke      INTERVAL HPI AND OVERNIGHT EVENTS:  Patient was examined and seen at bedside. This morning she is resting comfortably in bed.      REVIEW OF SYMPTOMS:  Denies any pain or sob. +weakness on L side    OBJECTIVE  PAST MEDICAL & SURGICAL HISTORY  No pertinent past medical history    No significant past surgical history      ALLERGIES:  No Known Allergies    MEDICATIONS:  STANDING MEDICATIONS  aspirin  chewable 81 milliGRAM(s) Oral daily  atorvastatin 80 milliGRAM(s) Oral at bedtime  carvedilol 3.125 milliGRAM(s) Oral every 12 hours  enoxaparin Injectable 40 milliGRAM(s) SubCutaneous daily  lactated ringers. 1000 milliLiter(s) IV Continuous <Continuous>    PRN MEDICATIONS      VITAL SIGNS: Last 24 Hours  T(C): 36.6 (18 Apr 2021 13:55), Max: 36.7 (17 Apr 2021 20:45)  T(F): 97.8 (18 Apr 2021 13:55), Max: 98 (17 Apr 2021 20:45)  HR: 96 (18 Apr 2021 13:55) (81 - 96)  BP: 130/62 (18 Apr 2021 13:55) (130/62 - 133/79)  BP(mean): --  RR: 18 (18 Apr 2021 13:55) (18 - 18)  SpO2: --    LABS:                        12.3   9.21  )-----------( 226      ( 18 Apr 2021 06:18 )             39.5     04-18    146  |  108  |  21<H>  ----------------------------<  116<H>  4.4   |  22  |  0.7    Ca    8.6      18 Apr 2021 06:18  Mg     2.3     04-18    TPro  5.5<L>  /  Alb  3.0<L>  /  TBili  0.7  /  DBili  x   /  AST  28  /  ALT  11  /  AlkPhos  87  04-18    PT/INR - ( 17 Apr 2021 08:56 )   PT: 13.60 sec;   INR: 1.18 ratio          RADIOLOGY:      PHYSICAL EXAM:  CONSTITUTIONAL: No acute distress, well-developed, AAOx3  HEAD: Atraumatic, normocephalic  EYES: EOMI  ENT: Supple  PULMONARY: Clear to auscultation bilaterally; non labored  CARDIOVASCULAR: Regular rate and rhythm; no murmurs  GASTROINTESTINAL: Soft, non-tender, non-distended;  MUSCULOSKELETAL: Moves 4/4 extremities, no appreciable lower extremity edema  NEUROLOGY: lethargic but arousable. L sided upper extremity weakness compared to R. No obvious strength differences in Lower extremities.  SKIN: Intact    ASSESSMENT & PLAN  89 y/o Female with no known PMHx on no medications who presents from home for AMS, left sided weakness.     # Acute CVA--Unclear etiology  - Non-con CTH (4/14) showed loss of gray-white differentiation with hypodensity in the right frontoparietal region (6/21-24) as well as anteriorly in the right frontal region (5/22-23) consistent with areas of acute infarction--Was not a candidate for tPA or NI on admission  - CT Perfusion (4/14) showed there are areas of hypoperfusion in the right frontal and right posterior parietal region with a total volume of hypoperfusion of 17 mL, with core infarct of 11 ml in the right frontal region. The penumbra volume (mismatch volume) is 6 mL for the right posterior parietal region.  - Repeat CTH: Redemonstration of an evolving right MCA territorial infarct with increased though mild associated cytotoxic edema and minimal midline shift to the left. No evidence of hemorrhagic transformation. Age-indeterminate left thalamic as well as caudate head lacunar infarcts.  - Patient was initially made comfort care in the ED, this AM neurological improvement was noted by family, palliative care had a conversation with the family and they would like to pursue medical management. If worsening they will revert to comfort care.   - Permissive HTN  - Patient has no known medical conditions, does not see physicians,   - f/u lipid profile, Hba1c, TSH with T4 reflex  - TTE with bubble showed   - Speech/Swallow following, diet per recs  - Neurology c/s: c/w 81mg ASA, 80mg atorvastatin daily  - Per neuro, if pt found to have Afib, would be OKAY FOR AC  - PT/Physiatry--recommending SNF on d/c  - Family initially wanted to pursue ILR placement but have since reconsidered. EP following and will check in with family      # NSTEMI  # Large Pericardial Effusion   # B/l Pericardial Effusion   - Trop 4.79 on admission --> 4.33 after 48 hrs  - Patient is not a candidate for intervention as per cardiology--supportive care only  - Not c/o any chest pain in AM   - Follow up TTE   - Medical management w/ ASA, Plavix  - Cleared for AC by neuro    #Diet: Dysphagia 1 nectar thick   #GI ppx: not indicated   #DVT ppx: hold off for now  #Code status: DNR/DNI, if worsening please call family.   #Dispo: pending TTE, PT, ILR placement          PAST MEDICAL & SURGICAL HISTORY:  No pertinent past medical history    No significant past surgical history     MIQUEL JEFFREY 89y Female  MRN#: [id number]   Hospital Day: 5d    SUBJECTIVE  Patient is a 89y old Female who presents with a chief complaint of AMS (17 Apr 2021 15:22)  Currently admitted to medicine with the primary diagnosis of Stroke      INTERVAL HPI AND OVERNIGHT EVENTS:  Patient was examined and seen at bedside. This morning she is resting comfortably in bed.      REVIEW OF SYMPTOMS:  Denies any pain or sob. +weakness on L side    OBJECTIVE  PAST MEDICAL & SURGICAL HISTORY  No pertinent past medical history    No significant past surgical history      ALLERGIES:  No Known Allergies    MEDICATIONS:  STANDING MEDICATIONS  aspirin  chewable 81 milliGRAM(s) Oral daily  atorvastatin 80 milliGRAM(s) Oral at bedtime  carvedilol 3.125 milliGRAM(s) Oral every 12 hours  enoxaparin Injectable 40 milliGRAM(s) SubCutaneous daily  lactated ringers. 1000 milliLiter(s) IV Continuous <Continuous>    PRN MEDICATIONS      VITAL SIGNS: Last 24 Hours  T(C): 36.6 (18 Apr 2021 13:55), Max: 36.7 (17 Apr 2021 20:45)  T(F): 97.8 (18 Apr 2021 13:55), Max: 98 (17 Apr 2021 20:45)  HR: 96 (18 Apr 2021 13:55) (81 - 96)  BP: 130/62 (18 Apr 2021 13:55) (130/62 - 133/79)  BP(mean): --  RR: 18 (18 Apr 2021 13:55) (18 - 18)  SpO2: --    LABS:                        12.3   9.21  )-----------( 226      ( 18 Apr 2021 06:18 )             39.5     04-18    146  |  108  |  21<H>  ----------------------------<  116<H>  4.4   |  22  |  0.7    Ca    8.6      18 Apr 2021 06:18  Mg     2.3     04-18    TPro  5.5<L>  /  Alb  3.0<L>  /  TBili  0.7  /  DBili  x   /  AST  28  /  ALT  11  /  AlkPhos  87  04-18    PT/INR - ( 17 Apr 2021 08:56 )   PT: 13.60 sec;   INR: 1.18 ratio          RADIOLOGY:      PHYSICAL EXAM:  CONSTITUTIONAL: No acute distress, well-developed, AAOx3  HEAD: Atraumatic, normocephalic  EYES: EOMI  ENT: Supple  PULMONARY: Clear to auscultation bilaterally; non labored  CARDIOVASCULAR: Regular rate and rhythm; no murmurs  GASTROINTESTINAL: Soft, non-tender, non-distended;  MUSCULOSKELETAL: Moves 4/4 extremities, no appreciable lower extremity edema  NEUROLOGY: lethargic but arousable. L sided upper extremity weakness compared to R. No obvious strength differences in Lower extremities.  SKIN: Intact    ASSESSMENT & PLAN  89 y/o Female with no known PMHx on no medications who presents from home for AMS, left sided weakness.     # Acute CVA--Unclear etiology, suspected cardio-embolic  - Non-con CTH (4/14) showed loss of gray-white differentiation with hypodensity in the right frontoparietal region (6/21-24) as well as anteriorly in the right frontal region (5/22-23) consistent with areas of acute infarction--Was not a candidate for tPA or NI on admission  - CT Perfusion (4/14) showed there are areas of hypoperfusion in the right frontal and right posterior parietal region with a total volume of hypoperfusion of 17 mL, with core infarct of 11 ml in the right frontal region. The penumbra volume (mismatch volume) is 6 mL for the right posterior parietal region.  - Repeat CTH: Redemonstration of an evolving right MCA territorial infarct with increased though mild associated cytotoxic edema and minimal midline shift to the left. No evidence of hemorrhagic transformation. Age-indeterminate left thalamic as well as caudate head lacunar infarcts.  - Patient was initially made comfort care in the ED, this AM neurological improvement was noted by family, palliative care had a conversation with the family and they would like to pursue medical management. If worsening they will revert to comfort care.   - Permissive HTN  - , HDL 61, TG 77  - A1c 6.2%  - TTE with bubble showed   - Speech/Swallow following, diet per recs  - Neurology c/s: c/w 81mg ASA, 80mg atorvastatin daily  - Per neuro, if pt found to have Afib, would be OKAY FOR AC  - PT/Physiatry--recommending SNF on d/c  - Family initially wanted to pursue ILR placement but have since reconsidered. EP following and will check in with family.       # NSTEMI  # Large Pericardial Effusion   # B/l Pericardial Effusion   - Trop 4.79 on admission --> 4.33 after 48 hrs  - Patient is not a candidate for intervention as per cardiology--supportive care only  - Not c/o any chest pain in AM   - TTE showed Diffuse hypokinesis of LV and multiple LV regional wall motion abnormalities  - Medical management w/ ASA, Plavix  - Cleared for AC by neuro    #Diet: Dysphagia 1 nectar thick   #GI ppx: not indicated   #DVT ppx: hold off for now  #Code status: DNR/DNI, if worsening please call family.   #Dispo: pending STR placement/selection and possible ILR placement          PAST MEDICAL & SURGICAL HISTORY:  No pertinent past medical history    No significant past surgical history     MIQUEL JEFFREY 89y Female  MRN#: [id number]   Hospital Day: 5d    SUBJECTIVE  Patient is a 89y old Female who presents with a chief complaint of AMS (17 Apr 2021 15:22)  Currently admitted to medicine with the primary diagnosis of Stroke      INTERVAL HPI AND OVERNIGHT EVENTS:  Patient was examined and seen at bedside. This morning she is resting comfortably in bed.      REVIEW OF SYMPTOMS:  Denies any pain or sob. +weakness on L side    OBJECTIVE  PAST MEDICAL & SURGICAL HISTORY  No pertinent past medical history    No significant past surgical history      ALLERGIES:  No Known Allergies    MEDICATIONS:  STANDING MEDICATIONS  aspirin  chewable 81 milliGRAM(s) Oral daily  atorvastatin 80 milliGRAM(s) Oral at bedtime  carvedilol 3.125 milliGRAM(s) Oral every 12 hours  enoxaparin Injectable 40 milliGRAM(s) SubCutaneous daily  lactated ringers. 1000 milliLiter(s) IV Continuous <Continuous>    PRN MEDICATIONS      VITAL SIGNS: Last 24 Hours  T(C): 36.6 (18 Apr 2021 13:55), Max: 36.7 (17 Apr 2021 20:45)  T(F): 97.8 (18 Apr 2021 13:55), Max: 98 (17 Apr 2021 20:45)  HR: 96 (18 Apr 2021 13:55) (81 - 96)  BP: 130/62 (18 Apr 2021 13:55) (130/62 - 133/79)  BP(mean): --  RR: 18 (18 Apr 2021 13:55) (18 - 18)  SpO2: --    LABS:                        12.3   9.21  )-----------( 226      ( 18 Apr 2021 06:18 )             39.5     04-18    146  |  108  |  21<H>  ----------------------------<  116<H>  4.4   |  22  |  0.7    Ca    8.6      18 Apr 2021 06:18  Mg     2.3     04-18    TPro  5.5<L>  /  Alb  3.0<L>  /  TBili  0.7  /  DBili  x   /  AST  28  /  ALT  11  /  AlkPhos  87  04-18    PT/INR - ( 17 Apr 2021 08:56 )   PT: 13.60 sec;   INR: 1.18 ratio          RADIOLOGY:      PHYSICAL EXAM:  CONSTITUTIONAL: No acute distress, well-developed, AAOx3  HEAD: Atraumatic, normocephalic  EYES: EOMI  ENT: Supple  PULMONARY: Clear to auscultation bilaterally; non labored  CARDIOVASCULAR: Regular rate and rhythm; no murmurs  GASTROINTESTINAL: Soft, non-tender, non-distended;  MUSCULOSKELETAL: Moves 4/4 extremities, no appreciable lower extremity edema  NEUROLOGY: lethargic but arousable. L sided upper extremity weakness compared to R. No obvious strength differences in Lower extremities.  SKIN: Intact    ASSESSMENT & PLAN  89 y/o Female with no known PMHx on no medications who presents from home for AMS, left sided weakness.     # Acute CVA--Unclear etiology, suspected cardio-embolic  - Non-con CTH (4/14) showed loss of gray-white differentiation with hypodensity in the right frontoparietal region (6/21-24) as well as anteriorly in the right frontal region (5/22-23) consistent with areas of acute infarction--Was not a candidate for tPA or NI on admission  - CT Perfusion (4/14) showed there are areas of hypoperfusion in the right frontal and right posterior parietal region with a total volume of hypoperfusion of 17 mL, with core infarct of 11 ml in the right frontal region. The penumbra volume (mismatch volume) is 6 mL for the right posterior parietal region.  - Repeat CTH: Redemonstration of an evolving right MCA territorial infarct with increased though mild associated cytotoxic edema and minimal midline shift to the left. No evidence of hemorrhagic transformation. Age-indeterminate left thalamic as well as caudate head lacunar infarcts.  - Patient was initially made comfort care in the ED, this AM neurological improvement was noted by family, palliative care had a conversation with the family and they would like to pursue medical management. If worsening they will revert to comfort care.   - Permissive HTN  - , HDL 61, TG 77  - A1c 6.2%  - TTE with bubble showed intact atrial septum  - Speech/Swallow following, diet per recs  - Neurology c/s: c/w 81mg ASA, 80mg atorvastatin daily  - Per neuro, if pt found to have Afib, would be OKAY FOR AC  - PT/Physiatry--recommending SNF on d/c  - Family initially wanted to pursue ILR placement but have since reconsidered. EP following and will check in with family.     # Dysphagia/Poor PO intake  - Per RN, pt has been effectively NPO as she's not tolerating feedings or meds  - EF 32% with severely decreased  - Will start gentle hydration for 6 hrs--beware signs of fluid overload    # NSTEMI  # Large Pericardial Effusion   # B/l Pericardial Effusion   - Trop 4.79 on admission --> 4.33 after 48 hrs  - Patient is not a candidate for intervention as per cardiology--supportive care only  - Not c/o any chest pain in AM   - TTE showed Diffuse hypokinesis of LV and multiple LV regional wall motion abnormalities  - Medical management w/ ASA, Plavix  - Cleared for AC by neuro    #Diet: Dysphagia 1 nectar thick   #GI ppx: not indicated   #DVT ppx: hold off for now  #Code status: DNR/DNI, if worsening please call family.   #Dispo: pending STR placement/selection and possible ILR placement          PAST MEDICAL & SURGICAL HISTORY:  No pertinent past medical history    No significant past surgical history

## 2021-04-18 NOTE — PROGRESS NOTE ADULT - SUBJECTIVE AND OBJECTIVE BOX
Patient is a 89y old  Female who presents with a chief complaint of AMS (18 Apr 2021 15:39)    INTERVAL HPI/OVERNIGHT EVENTS: no events overnight     ROS: unable to obtain    PHYSICAL EXAM:  T(C): 36.6, Max: 36.7 (04-17-21 @ 20:45)  HR: 96 (81 - 96)  BP: 130/62 (130/62 - 133/79)  RR: 18 (18 - 18)  SpO2: --    GENERAL: NAD, frail, drowsy  PULMONARY/CHEST: No rales, rhonchi, or wheezing  CARDIOVASC: Regular rate and rhythm; No murmurs  GI/ABDOMEN: Soft, Nontender, Nondistended; Bowel sounds present  EXTREMITIES:  no edema, no calf tenderness b/l. +severe onychomycosis and toes deformation.  NERVOUS SYSTEM: awake, responds to her name, answers questions, cranial nerves grossly intact, motor left UE 4/5, legs b/l 4/5    LABS:                        12.3   9.21  )-----------( 226      ( 18 Apr 2021 06:18 )             39.5     04-18    146  |  108  |  21<H>  ----------------------------<  116<H>  4.4   |  22  |  0.7    Ca    8.6      18 Apr 2021 06:18  Mg     2.3     04-18    TPro  5.5<L>  /  Alb  3.0<L>  /  TBili  0.7  /  DBili  x   /  AST  28  /  ALT  11  /  AlkPhos  87  04-18    PT/INR - ( 17 Apr 2021 08:56 )   PT: 13.60 sec;   INR: 1.18 ratio         RADIOLOGY & ADDITIONAL TESTS:  no new tests      MEDICATIONS  (STANDING):  aspirin  chewable 81 milliGRAM(s) Oral daily  atorvastatin 80 milliGRAM(s) Oral at bedtime  carvedilol 3.125 milliGRAM(s) Oral every 12 hours  enoxaparin Injectable 40 milliGRAM(s) SubCutaneous daily  lactated ringers. 1000 milliLiter(s) (50 mL/Hr) IV Continuous <Continuous>    MEDICATIONS  (PRN):

## 2021-04-18 NOTE — PROGRESS NOTE ADULT - ASSESSMENT
89 yo F with no MHx (pt was not following with doctors and not on meds) presented with AMS and left side weakness.    # NSTEMI - supportive management as per cardio. Pt is not candidate for invasive interventions. Repeat Trop stable, not trending up. Cont ASA and Statin, can add BB, start with low dose.  # Ischemic cardiomyopathy, HFrEF, EF 32%.  - echo reviewed, multiple hypokinetic motion wall abnormalities  - pt looks euvolemic  - high risk for unstable arrhythmias, but daughter does not want life vest ot ICD    # Acute CVA, likely cardioembolic   - CTH: right MCA territorial infarct with increased though mild associated cytotoxic edema and minimal midline shift to the left. No evidence of hemorrhagic transformation.  - neurological deficit resolved. Neurology okay to start AC if needed. SLP advised on diet. PT/OT eval. Spoke to daughter about loop recorder, she is not sure if she wants it. Daughter wants pt to be discharged in rehab.   - patent foramen ovale  - HbA1C 6.2  - lipid profile noted    # Pericardial effusion small, no interventions.    DVT ppx Lovenox    DNR/DNI    Poor prognosis.    DC planning to STR

## 2021-04-19 LAB
ALBUMIN SERPL ELPH-MCNC: 2.8 G/DL — LOW (ref 3.5–5.2)
ALP SERPL-CCNC: 81 U/L — SIGNIFICANT CHANGE UP (ref 30–115)
ALT FLD-CCNC: 9 U/L — SIGNIFICANT CHANGE UP (ref 0–41)
ANION GAP SERPL CALC-SCNC: 14 MMOL/L — SIGNIFICANT CHANGE UP (ref 7–14)
AST SERPL-CCNC: 22 U/L — SIGNIFICANT CHANGE UP (ref 0–41)
BILIRUB SERPL-MCNC: 0.7 MG/DL — SIGNIFICANT CHANGE UP (ref 0.2–1.2)
BUN SERPL-MCNC: 31 MG/DL — HIGH (ref 10–20)
CALCIUM SERPL-MCNC: 8.7 MG/DL — SIGNIFICANT CHANGE UP (ref 8.5–10.1)
CHLORIDE SERPL-SCNC: 111 MMOL/L — HIGH (ref 98–110)
CO2 SERPL-SCNC: 24 MMOL/L — SIGNIFICANT CHANGE UP (ref 17–32)
CREAT SERPL-MCNC: 0.8 MG/DL — SIGNIFICANT CHANGE UP (ref 0.7–1.5)
GLUCOSE SERPL-MCNC: 148 MG/DL — HIGH (ref 70–99)
HCT VFR BLD CALC: 38.8 % — SIGNIFICANT CHANGE UP (ref 37–47)
HGB BLD-MCNC: 11.9 G/DL — LOW (ref 12–16)
MAGNESIUM SERPL-MCNC: 2.3 MG/DL — SIGNIFICANT CHANGE UP (ref 1.8–2.4)
MCHC RBC-ENTMCNC: 27.4 PG — SIGNIFICANT CHANGE UP (ref 27–31)
MCHC RBC-ENTMCNC: 30.7 G/DL — LOW (ref 32–37)
MCV RBC AUTO: 89.4 FL — SIGNIFICANT CHANGE UP (ref 81–99)
NRBC # BLD: 0 /100 WBCS — SIGNIFICANT CHANGE UP (ref 0–0)
PLATELET # BLD AUTO: 193 K/UL — SIGNIFICANT CHANGE UP (ref 130–400)
POTASSIUM SERPL-MCNC: 4.5 MMOL/L — SIGNIFICANT CHANGE UP (ref 3.5–5)
POTASSIUM SERPL-SCNC: 4.5 MMOL/L — SIGNIFICANT CHANGE UP (ref 3.5–5)
PROT SERPL-MCNC: 5.4 G/DL — LOW (ref 6–8)
RBC # BLD: 4.34 M/UL — SIGNIFICANT CHANGE UP (ref 4.2–5.4)
RBC # FLD: 14.6 % — HIGH (ref 11.5–14.5)
SARS-COV-2 RNA SPEC QL NAA+PROBE: SIGNIFICANT CHANGE UP
SODIUM SERPL-SCNC: 149 MMOL/L — HIGH (ref 135–146)
WBC # BLD: 10.14 K/UL — SIGNIFICANT CHANGE UP (ref 4.8–10.8)
WBC # FLD AUTO: 10.14 K/UL — SIGNIFICANT CHANGE UP (ref 4.8–10.8)

## 2021-04-19 PROCEDURE — 99233 SBSQ HOSP IP/OBS HIGH 50: CPT

## 2021-04-19 RX ADMIN — ENOXAPARIN SODIUM 40 MILLIGRAM(S): 100 INJECTION SUBCUTANEOUS at 12:41

## 2021-04-19 RX ADMIN — ATORVASTATIN CALCIUM 80 MILLIGRAM(S): 80 TABLET, FILM COATED ORAL at 22:52

## 2021-04-19 NOTE — CHART NOTE - NSCHARTNOTEFT_GEN_A_CORE
Spoke with patient's daughter No, family does not wish to pursue loop implant at this time and will be placing patient in hospice care.  No further EP intervention, will sign off at this time.  Recall as needed 0117

## 2021-04-19 NOTE — SWALLOW BEDSIDE ASSESSMENT ADULT - SWALLOW EVAL: RECOMMENDED FEEDING/EATING TECHNIQUES
alternate food with liquid/check mouth frequently for oral residue/pocketing/crush medication (when feasible)/oral hygiene/position upright (90 degrees)/small sips/bites
allow for swallow between intakes/alternate food with liquid/check mouth frequently for oral residue/pocketing/crush medication (when feasible)/oral hygiene/position upright (90 degrees)/small sips/bites
alternate food with liquid/check mouth frequently for oral residue/pocketing/crush medication (when feasible)/oral hygiene/position upright (90 degrees)/small sips/bites

## 2021-04-19 NOTE — SWALLOW BEDSIDE ASSESSMENT ADULT - SLP GENERAL OBSERVATIONS
Pt repositioned upright in bed, alert and oriented x3, no c/o pain. +o2 NC. delayed verbal responses. able to follow commands.
Pt repositioned upright in bed, alert and oriented x3, no c/o pain. +4L o2 NC. slow verbal responses. able to follow commands.
pt received in bed asleep arousable w/o c/o pain. +generalized weakness/lethargy, pt kept eyes closed t/o evaluation. RN reports pt w/ min PO intake.

## 2021-04-19 NOTE — SWALLOW BEDSIDE ASSESSMENT ADULT - SLP PERTINENT HISTORY OF CURRENT PROBLEM
89 y/o F presented from home w/ AMS and L sided weakness, found by her family. stroke code activated, CTH (+) hypodensity in R frontoparietal and anterior R frontal regions, acute infarct. No PMHx, pt lives home alone and functionally independent. Comfort measures rescinded, palliative following.
89 y/o F presented from home w/ AMS and L sided weakness, found by her family. stroke code activated, CTH (+) hypodensity in R frontoparietal and anterior R frontal regions, acute infarct. No PMHx, pt lives home alone and functionally independent. Pt made comfort measures only, however palliative care c/s pending.
pt is a 89 y/o F w/ no known MHx (pt was not following with doctors and not on meds) p/w AMS and L-side weakness. pt being treated for acute CVA, CTH: right MCA territorial infarct with increased though mild associated cytotoxic edema and minimal midline shift to the left. No evidence of hemorrhagic transformation. Course c/b NSTEMI - supportive management as per cardio. Pt is not candidate for invasive interventions. Hospice c/s 2' worsening functional status.

## 2021-04-19 NOTE — SWALLOW BEDSIDE ASSESSMENT ADULT - SWALLOW EVAL: DIAGNOSIS
moderate oral dysphagia for puree and nectar thick liquids w/o overt s/s penetration/aspiration. suspected pharyngeal dysphagia for thin liquids.
moderate oral dysphagia for puree and nectar-thick liquids w/o overt s/s aspiration/penetration
mild oral dysphagia for puree and nectar thick liquids w/o overt s/s penetration/aspiration. suspected pharyngeal dysphagia for thin liquids. moderate oral dysphagia for soft solids.

## 2021-04-19 NOTE — SWALLOW BEDSIDE ASSESSMENT ADULT - SWALLOW EVAL: RECOMMENDED DIET
dysphagia 1 w/ nectar-thick liquids
dysphagia 1 (puree) w/ nectar thick liquids, small sips
dysphagia 1 (puree) w/ nectar thick liquids, small sips

## 2021-04-19 NOTE — SWALLOW BEDSIDE ASSESSMENT ADULT - NS SPL SWALLOW CLINIC TRIAL FT
suspected pharyngeal dysphagia for thin liquids
pt not appropriate for PO upgrade at this time.
suspected pharyngeal dysphagia for thin liquids

## 2021-04-19 NOTE — SWALLOW BEDSIDE ASSESSMENT ADULT - ASR SWALLOW ASPIRATION MONITOR
oral hygiene/position upright (90Y)/cough/gurgly voice/throat clearing
oral hygiene/cough/gurgly voice/throat clearing
oral hygiene/position upright (90Y)/cough/gurgly voice/throat clearing

## 2021-04-20 DIAGNOSIS — Z51.5 ENCOUNTER FOR PALLIATIVE CARE: ICD-10-CM

## 2021-04-20 LAB
ALBUMIN SERPL ELPH-MCNC: 2.6 G/DL — LOW (ref 3.5–5.2)
ALP SERPL-CCNC: 80 U/L — SIGNIFICANT CHANGE UP (ref 30–115)
ALT FLD-CCNC: 7 U/L — SIGNIFICANT CHANGE UP (ref 0–41)
ANION GAP SERPL CALC-SCNC: 12 MMOL/L — SIGNIFICANT CHANGE UP (ref 7–14)
AST SERPL-CCNC: 25 U/L — SIGNIFICANT CHANGE UP (ref 0–41)
BASOPHILS # BLD AUTO: 0.01 K/UL — SIGNIFICANT CHANGE UP (ref 0–0.2)
BASOPHILS NFR BLD AUTO: 0.1 % — SIGNIFICANT CHANGE UP (ref 0–1)
BILIRUB SERPL-MCNC: 0.7 MG/DL — SIGNIFICANT CHANGE UP (ref 0.2–1.2)
BUN SERPL-MCNC: 28 MG/DL — HIGH (ref 10–20)
CALCIUM SERPL-MCNC: 8.5 MG/DL — SIGNIFICANT CHANGE UP (ref 8.5–10.1)
CHLORIDE SERPL-SCNC: 113 MMOL/L — HIGH (ref 98–110)
CO2 SERPL-SCNC: 24 MMOL/L — SIGNIFICANT CHANGE UP (ref 17–32)
CREAT SERPL-MCNC: 0.7 MG/DL — SIGNIFICANT CHANGE UP (ref 0.7–1.5)
EOSINOPHIL # BLD AUTO: 0.03 K/UL — SIGNIFICANT CHANGE UP (ref 0–0.7)
EOSINOPHIL NFR BLD AUTO: 0.3 % — SIGNIFICANT CHANGE UP (ref 0–8)
GLUCOSE SERPL-MCNC: 140 MG/DL — HIGH (ref 70–99)
HCT VFR BLD CALC: 39 % — SIGNIFICANT CHANGE UP (ref 37–47)
HGB BLD-MCNC: 12 G/DL — SIGNIFICANT CHANGE UP (ref 12–16)
IMM GRANULOCYTES NFR BLD AUTO: 0.4 % — HIGH (ref 0.1–0.3)
LYMPHOCYTES # BLD AUTO: 0.61 K/UL — LOW (ref 1.2–3.4)
LYMPHOCYTES # BLD AUTO: 6.6 % — LOW (ref 20.5–51.1)
MAGNESIUM SERPL-MCNC: 2.3 MG/DL — SIGNIFICANT CHANGE UP (ref 1.8–2.4)
MCHC RBC-ENTMCNC: 27.6 PG — SIGNIFICANT CHANGE UP (ref 27–31)
MCHC RBC-ENTMCNC: 30.8 G/DL — LOW (ref 32–37)
MCV RBC AUTO: 89.9 FL — SIGNIFICANT CHANGE UP (ref 81–99)
MONOCYTES # BLD AUTO: 1.07 K/UL — HIGH (ref 0.1–0.6)
MONOCYTES NFR BLD AUTO: 11.6 % — HIGH (ref 1.7–9.3)
NEUTROPHILS # BLD AUTO: 7.48 K/UL — HIGH (ref 1.4–6.5)
NEUTROPHILS NFR BLD AUTO: 81 % — HIGH (ref 42.2–75.2)
NRBC # BLD: 0 /100 WBCS — SIGNIFICANT CHANGE UP (ref 0–0)
PLATELET # BLD AUTO: 273 K/UL — SIGNIFICANT CHANGE UP (ref 130–400)
POTASSIUM SERPL-MCNC: 4.2 MMOL/L — SIGNIFICANT CHANGE UP (ref 3.5–5)
POTASSIUM SERPL-SCNC: 4.2 MMOL/L — SIGNIFICANT CHANGE UP (ref 3.5–5)
PROT SERPL-MCNC: 5.3 G/DL — LOW (ref 6–8)
RBC # BLD: 4.34 M/UL — SIGNIFICANT CHANGE UP (ref 4.2–5.4)
RBC # FLD: 14.5 % — SIGNIFICANT CHANGE UP (ref 11.5–14.5)
SODIUM SERPL-SCNC: 149 MMOL/L — HIGH (ref 135–146)
WBC # BLD: 9.24 K/UL — SIGNIFICANT CHANGE UP (ref 4.8–10.8)
WBC # FLD AUTO: 9.24 K/UL — SIGNIFICANT CHANGE UP (ref 4.8–10.8)

## 2021-04-20 PROCEDURE — 99232 SBSQ HOSP IP/OBS MODERATE 35: CPT

## 2021-04-20 PROCEDURE — 99233 SBSQ HOSP IP/OBS HIGH 50: CPT

## 2021-04-20 PROCEDURE — 99497 ADVNCD CARE PLAN 30 MIN: CPT | Mod: 25

## 2021-04-20 RX ORDER — CARVEDILOL PHOSPHATE 80 MG/1
1 CAPSULE, EXTENDED RELEASE ORAL
Qty: 0 | Refills: 0 | DISCHARGE
Start: 2021-04-20

## 2021-04-20 RX ORDER — ASPIRIN/CALCIUM CARB/MAGNESIUM 324 MG
1 TABLET ORAL
Qty: 0 | Refills: 0 | DISCHARGE
Start: 2021-04-20

## 2021-04-20 RX ORDER — SCOPALAMINE 1 MG/3D
1 PATCH, EXTENDED RELEASE TRANSDERMAL
Refills: 0 | Status: DISCONTINUED | OUTPATIENT
Start: 2021-04-20 | End: 2021-04-21

## 2021-04-20 RX ORDER — MORPHINE SULFATE 50 MG/1
5 CAPSULE, EXTENDED RELEASE ORAL EVERY 4 HOURS
Refills: 0 | Status: DISCONTINUED | OUTPATIENT
Start: 2021-04-20 | End: 2021-04-21

## 2021-04-20 RX ORDER — ATORVASTATIN CALCIUM 80 MG/1
1 TABLET, FILM COATED ORAL
Qty: 0 | Refills: 0 | DISCHARGE
Start: 2021-04-20

## 2021-04-20 RX ADMIN — CARVEDILOL PHOSPHATE 3.12 MILLIGRAM(S): 80 CAPSULE, EXTENDED RELEASE ORAL at 06:07

## 2021-04-20 RX ADMIN — SCOPALAMINE 1 PATCH: 1 PATCH, EXTENDED RELEASE TRANSDERMAL at 17:21

## 2021-04-20 NOTE — DISCHARGE NOTE PROVIDER - NSDCMRMEDTOKEN_GEN_ALL_CORE_FT
aspirin 81 mg oral tablet, chewable: 1 tab(s) orally once a day  atorvastatin 80 mg oral tablet: 1 tab(s) orally once a day (at bedtime)  carvedilol 3.125 mg oral tablet: 1 tab(s) orally every 12 hours   carvedilol 3.125 mg oral tablet: 1 tab(s) orally every 12 hours

## 2021-04-20 NOTE — DIETITIAN INITIAL EVALUATION ADULT. - OTHER CALCULATIONS
calorie [id number] (MSJ x 1-1.1 AF) age + BMI considered  protein ~67g (~1g/kg CBW)  fluid 1ml/kcal or per LIP

## 2021-04-20 NOTE — DIETITIAN INITIAL EVALUATION ADULT. - ORAL INTAKE PTA/DIET HISTORY
Pt. lethargic during visit, unable to obtain nutr hx. Did not attempt to call family as hospice being considered at this time.  NKFA per EMR. No previous admits to compare weight trends.

## 2021-04-20 NOTE — DIETITIAN INITIAL EVALUATION ADULT. - CONTINUE CURRENT NUTRITION CARE PLAN
Nutrition intervention: meals and snacks, medical food supplement. Continue dysphagia 1 pureed nectar consistency fluids as tolerated, add Ensure pudding TID, Prosource gelatein BID. Assist and encourage pt. during meals./yes

## 2021-04-20 NOTE — DIETITIAN INITIAL EVALUATION ADULT. - OTHER INFO
P/w: stroke. Acute CVA: family likely option for hospice at this time, consult placed.  Dysphagia/Poor PO intake: likely comfort feeds.  NSTEMI complicated by pericardial effusion: no intervention.

## 2021-04-20 NOTE — PROGRESS NOTE ADULT - SUBJECTIVE AND OBJECTIVE BOX
MIQUEL JEFFREY 89y Female  MRN#: [id number]   CODE STATUS:DNR/DNI      SUBJECTIVE  Patient is a 89y old Female who presents with a chief complaint of AMS (18 Apr 2021 16:12)  Currently admitted to medicine with the primary diagnosis of Stroke    Patient examined at bedside. Today more awake, responsive. Family decision for comfort measures. Patient for hospice care discharge tomorrow.       OBJECTIVE  PAST MEDICAL & SURGICAL HISTORY  No pertinent past medical history    No significant past surgical history      ALLERGIES:  No Known Allergies    MEDICATIONS:  STANDING MEDICATIONS  aspirin  chewable 81 milliGRAM(s) Oral daily  atorvastatin 80 milliGRAM(s) Oral at bedtime  carvedilol 3.125 milliGRAM(s) Oral every 12 hours    PRN MEDICATIONS      VITAL SIGNS: Last 24 Hours  T(C): 37.1 (20 Apr 2021 05:15), Max: 37.1 (20 Apr 2021 05:15)  T(F): 98.8 (20 Apr 2021 05:15), Max: 98.8 (20 Apr 2021 05:15)  HR: 109 (20 Apr 2021 05:15) (100 - 112)  BP: 138/81 (20 Apr 2021 05:15) (128/88 - 161/94)  BP(mean): --  RR: 18 (20 Apr 2021 05:15) (18 - 18)  SpO2: 94% (19 Apr 2021 13:51) (94% - 94%)    LABS:                        12.0   9.24  )-----------( 273      ( 20 Apr 2021 07:08 )             39.0     04-20    149<H>  |  113<H>  |  28<H>  ----------------------------<  140<H>  4.2   |  24  |  0.7    Ca    8.5      20 Apr 2021 07:08  Mg     2.3     04-20    TPro  5.3<L>  /  Alb  2.6<L>  /  TBili  0.7  /  DBili  x   /  AST  25  /  ALT  7   /  AlkPhos  80  04-20        PHYSICAL EXAM:      GENERAL: Supine in bed, right-sided gaze, AAOx2  HEENT: Opens R eye > left, facial muscles intact  PULMONARY: Clear to auscultation bilaterally; No wheeze  CARDIOVASCULAR: Regular rate and rhythm; No murmurs, rubs, or gallops  GASTROINTESTINAL: Soft, Nontender, Nondistended; Bowel sounds present  MUSCULOSKELETAL:  2+ Peripheral Pulses, No clubbing, cyanosis, or edema  NEUROLOGY: Moves RUE and RLE spontaneously; does not move LUE/LLE   SKIN: No rashes or lesions      ASSESSMENT & PLAN      89 y/o Female with no known PMHx on no medications who presents from home for AMS, left sided weakness.     1) Comfort measures  -Patient comfort measures only  -No IV sticks/lab draws/vitals  -Can take baby aspirin, cardiac medications as tolerated per palliative  -D/C to hospice tomorrow    2) Acute CVA  - Non-con CTH (4/14) showed loss of gray-white differentiation with hypodensity in the right frontoparietal region (6/21-24) as well as anteriorly in the right frontal region (5/22-23) consistent with areas of acute infarction--Was not a candidate for tPA or NI on admission  - CT Perfusion (4/14) showed there are areas of hypoperfusion in the right frontal and right posterior parietal region with a total volume of hypoperfusion of 17 mL, with core infarct of 11 ml in the right frontal region. The penumbra volume (mismatch volume) is 6 mL for the right posterior parietal region.  - Repeat CTH: Redemonstration of an evolving right MCA territorial infarct with increased though mild associated cytotoxic edema and minimal midline shift to the left. No evidence of hemorrhagic transformation. Age-indeterminate left thalamic as well as caudate head lacunar infarcts.  - Patient for hospice care  - , HDL 61, TG 77  - A1c 6.2%  - TTE with bubble showed intact atrial septum  - Speech/Swallow following, diet per recs; however patient not taking PO  - Neurology c/s: c/w 81mg ASA, 80mg atorvastatin daily  - PT/Physiatry--recommending SNF on d/c  -No loop recorder    3) Dysphagia/Poor PO intake  - Per RN, pt has been effectively NPO as she's not tolerating feedings or meds  - EF 32% with severely decreased  - Family discussion re:goals of care (comfort feeds)    4) NSTEMI complicated by pericardial effusion  - Trop 4.79 on admission --> 4.33 after 48 hrs  - No intervention  - Not c/o any chest pain in AM   - TTE showed Diffuse hypokinesis of LV and multiple LV regional wall motion abnormalities  - Medical management w/ ASA, Plavix  - Cleared for AC by neuro    MISC:  -Diet: Dysphagia 1 nectar thick   -GI ppx: not indicated   -DVT ppx:none  -Code status: DNR/DNI  -Dispo: Hospice

## 2021-04-20 NOTE — GOALS OF CARE CONVERSATION - ADVANCED CARE PLANNING - TREATMENT GUIDELINES
DNI/DNR Order/Do not re-hospitalize/No blood draws/No artificial nutrition/No antibiotics/No IV fluids

## 2021-04-20 NOTE — PROGRESS NOTE ADULT - ASSESSMENT
89yFemale being evaluated for goals of care and symptom management. Pt s/p CVA & NSTEMI. Initially condition improved and pt was awake and conversant with new left sided deficits. Pt has again had a decline in condition in last 24hrs. Family was updated and opted for Hospice consult. They want a transfer to [facility]    Palliative team met with daughter and son in law over the phone and reviewed pt's overall condition and decline. They want comfort measures only along w continued DNR/DNI. Pt did not want her life prolonged. Hospice plan being made.    Palliative NP spoke to medical resident and updated/ also spoke to hospice nurse Jessica      MEDDAMON (morphine equivalent daily dose): 0    time spent advance care planning 35 min  See Recs below.  -DNR/DNI/CMO  - Roxanol 5mg Q 4 hrs PRN for pain or dyspnea  - Scopolamine patch q 72hrs   - Hospice planning     Please call [phone] with questions or concerns 24/7.   We will continue to follow.

## 2021-04-20 NOTE — DIETITIAN INITIAL EVALUATION ADULT. - IDEAL BODY WEIGHT (LBS)
Madyson Hicks is a very pleasant 59year old female who present to me today for a bilateral sacroiliac joint injection as well as a rectus capitis and longissimus trigger point injection under MAC anesthesia for uncontrolled anxiety and needle phobia. VSS  Alert & oriented X 3  CV RRR no m/r/g  Pulm Bilaterally clear to auscultation  Musculoskeletal: bilateral Unique's positive, tenderness to palpation bilateral sacroiliac joints    Past Medical history significant for chronic pain and anxiety. Past Surgical History: See chart  Allergies: Extensive, see chart    ROS: positive for anxiety, low back, mid back, and neck pain. Assessment: 59year old female with extreme anxiety and chronic pain who presents for a bilateral sacroiliac joint injection and trigger point injections under MAC anesthesia. Plan: We will proceed to the operating room for today's anticipated intervention.
110

## 2021-04-20 NOTE — PROGRESS NOTE ADULT - PROBLEM SELECTOR PLAN 2
Patient seen by neurology. Family wants no further interventions
Patient seen by neurology.  Appreciate recommendations

## 2021-04-20 NOTE — DISCHARGE NOTE PROVIDER - HOSPITAL COURSE
Patient is a 89 y/o woman with no known PMH who presented for AMS. Patient was found to have R frontal/ R frontoparietal region stroke on CT ead after stroke code called. Patient was not a candidate for tPA or neurointerventional. Patient hospital course complicated by NSTEMI; not candidate for revascularization. Patient with worsening functional status; unable to tolerate much PO. Family decided fr comfort measures 4/20. Patient to be discharged to hospice.

## 2021-04-20 NOTE — GOALS OF CARE CONVERSATION - ADVANCED CARE PLANNING - CONVERSATION DETAILS
Spoke to pt's daughter and son in law    Discussed overall prognosis and treatment. They know she is not going to likely improve for a meaningful recovery. She did not want life prolonging measures.     Discussed comfort measures only including stopping IVF, comfort feeding and transfer to hospice. They want hospice at [facility]. Bed is available and they want her to go.    Comfort measures start today

## 2021-04-20 NOTE — DIETITIAN INITIAL EVALUATION ADULT. - PHYSCIAL ASSESSMENT
BMI 26.9 using 66.8kg as appears closer to that weight, dosing weight 81.6kg,  lethargic, somnolent, skin: intact/overweight

## 2021-04-20 NOTE — PROGRESS NOTE ADULT - SUBJECTIVE AND OBJECTIVE BOX
MIQUEL JEFFREY             MRN-[id number]    CC: AMS    HPI:   89y Female with no known PMHx on no medications who presents from home for AMS, left sided weakness. Per son-in-law, pt spoke with family this morning at 11:00am and told them that she didn't feel well and hadn't slept well overnight, was answering questions appropriately during phone call. He states that last night she did not voice any complaints to family. Family went to house at around 4pm, found pt slumped in chair leaning to the left and called EMS. On arrival to ED, pt was hypertensive (216/168) and tachycardic with labored breathing. Stroke code called PTA. Pt able to provide limited Hx due to clinical condition, denies HA. Initial NIHSS 17, CTH showed early signs of acute infarct of R frontal/R frontoparietal region. Not a tPA candidate as she is out of window. CTP showed area of core infarct without any surrounding salvageable tissue, not a candidate for NI. At baseline pt lives alone and is functionally independent.        ED: STEMI Code was called  evaluated the patient in the ER by cardio fellow. EKG in the ER showed sinus tachycardia with anteroseptal Q waves (unknown chronicity). After discussion with Interventionalist on call (Dr. Troncoso) STEMI code was cancelled. Pt. with markedly elevated systemic BP in the ER with SBP of 180s. Bedside echo shows mild pericardial effusion. Hypertensive Emergency improved on Esmolol which is now off.  Dissection was ruled on CTA.     Family  Discussion Proxy  Yessica Guerra  and other family members  made patient comfort Care  Only with primary Treatment Goal  being  Comfort. Patient very Confused at bedside not answering  commands.        (13 Apr 2021 21:22)      SUBJECTIVE:    ROS:  DYSPNEA: Y / N	  NAUS/VOM: Y / N	  SECRETIONS: Y / N	  AGITATION: Y / N  Pain (Y/N):    NO     OTHER REVIEW OF SYSTEMS:  UNABLE TO OBTAIN  due to: unresponsive     PEx:  89y   eneral: AAOx0   found in bed  HEENT:  NCAT PERRL EOMI Non icteric MOM  Neck: Supple no masses  CVS: RR S1S2 No M/G/R no edema   Resp: Unlabored Non tachypneic, no SOB   GI:  Soft NT ND BS+  : Primafit   Musc: No C/C/E  - feet with contracted toes b/l  Neuro: minimally responsive today  Psych: unable to assess   Skin: Non jaundiced - keratosis  Lymph: Normal               MEDICATIONS: REVIEWED  MEDICATIONS  (STANDING):  aspirin  chewable 81 milliGRAM(s) Oral daily  atorvastatin 80 milliGRAM(s) Oral at bedtime  carvedilol 3.125 milliGRAM(s) Oral every 12 hours    MEDICATIONS  (PRN):      LABS: REVIEWED  CBC:                        12.0   9.24  )-----------( 273      ( 20 Apr 2021 07:08 )             39.0     CMP:    04-20    149<H>  |  113<H>  |  28<H>  ----------------------------<  140<H>  4.2   |  24  |  0.7    Ca    8.5      20 Apr 2021 07:08  Mg     2.3     04-20    TPro  5.3<L>  /  Alb  2.6<L>  /  TBili  0.7  /  DBili  x   /  AST  25  /  ALT  7   /  AlkPhos  80  04-20  Albumin, Serum: 2.6 g/dL (04-20-21 @ 07:08)      IMAGING: REVIEWED    ADVANCED DIRECTIVES:       DNR/DNI now CMO again     DECISION MAKER & LEGAL SURROGATE: Yessica Guerra     PSYCHOSOCIAL-SPIRITUAL ASSESSMENT:       Reviewed       Care plan unchanged       Care plan adjusted as above	    GOALS OF CARE DISCUSSION       Palliative care info/counseling provided	           Family meeting       Advanced Directives addressed please see Advance Care Planning Note	           See previous Palliative Medicine Note       Documentation of GOC: see separate GOC not 4/20/21    CURRENT DISPO PLAN:     Hospice d/c planned to [facility]       REFERRALS	        Palliative Med        Unit SW/Case Mgmt               Patient/Family Support      Hospice

## 2021-04-20 NOTE — DISCHARGE NOTE PROVIDER - NSDCCPCAREPLAN_GEN_ALL_CORE_FT
PRINCIPAL DISCHARGE DIAGNOSIS  Diagnosis: Stroke  Assessment and Plan of Treatment: You had a stroke. This is likely due to clotting of your arteries of your brain. You will be discharged to hospice where comfort and quality of life measures will be prioritized.      SECONDARY DISCHARGE DIAGNOSES  Diagnosis: Pericardial effusion  Assessment and Plan of Treatment:

## 2021-04-20 NOTE — PROGRESS NOTE ADULT - PROVIDER SPECIALTY LIST ADULT
Cardiology
Internal Medicine
Hospitalist
Internal Medicine
Internal Medicine
Cardiology
Cardiology
Hospitalist
Cardiology
Neurology
Palliative Care
Palliative Care

## 2021-04-20 NOTE — PROGRESS NOTE ADULT - PROBLEM SELECTOR PLAN 5
Likely due to underlying condition.,  Rule out other causes.  PT when possible.
Supportive care, hospice

## 2021-04-20 NOTE — PROGRESS NOTE ADULT - NSICDXPILOT_GEN_ALL_CORE
[place]

## 2021-04-20 NOTE — PROGRESS NOTE ADULT - ATTENDING COMMENTS
Patient seen and examined and agree with above except as noted.  Patients history, notes, labs, imaging, vitals and meds reviewed personally.  Patient improved fom presentation with some residual symptoms related to right MCA territory infarct with current NIHSS 6.  Etiology of stroke unclear however after reviewing cerebral vessel imaging most likely cardioembolic etiology.    Plan as above
Pt does not have active complaints, denies chest pain, no SOB, no weakness.   No neurological deficit.     labs and images reviewed.    # NSTEMI - management as per cardio. Pt is not candidate for invasive interventions. AC as per cardio. Repeat Trop. ECHO pending. Cont ASA and Statin, can add BB, start with low dose.  # Acute CVA, most likely cardioembolic - neurological deficit resolved. Neurology okay to start AC if needed. Continue telemetry to r/o A. Fib, pt might need Loop recorder. PT/OT eval. SLP eval noted.  # Pericardial effusion on bedside ECHO - ECHO pending. Hemodynamically stable.    DVT ppx Lovenox     DNR/DNI
Pt is comfort measures only. Plan for discharge to [facility] tomorrow, pending COVID swab.
Pt is more sleepy today, she does not have complaints, denies chest pain, no SOB, no headache, no weakness anywhere in her body.     Physical exam:  NAD, frail  NECK: supple, no JVD  RESP: CTA b/l, no crackles, rhonchi  CVS: S1S2, RRR  GI: abdomen soft NT, ND  Extremities: no LE edema, b/l deformation of toes with elongated nails with onychomycosis.    NEURO: awake, responds to name, follows simple commands. CN grossly intact. Moves all 4 extremities, sensation seems intact.     labs reviewed.    A/P: 89 yo F with no MHx (pt was not following with doctors and not on meds) presented with AMS and left side weakness.    # NSTEMI - supportive management as per cardio. Pt is not candidate for invasive interventions. Repeat Trop stable, not trending up. ECHO still pending. Cont ASA and Statin, can add BB, start with low dose.  # Acute CVA, most likely cardioembolic   - CTH: right MCA territorial infarct with increased though mild associated cytotoxic edema and minimal midline shift to the left. No evidence of hemorrhagic transformation.  - neurological deficit resolved. Neurology okay to start AC if needed. SLP advised on diet. PT/OT norman. Spoke to daughter about loop recorder, she is not sure if she wants it. Daughter wants pt to be discharged in rehab.   - HbA1C 6.2  - lipid profile noted  # Pericardial effusion on bedside ECHO - ECHO pending. Hemodynamically stable.    DVT ppx Lovenox    Spoke to daughter, all questions answered.
Pt with new Left side facial droop and LUE weakness.    I spoke to daughter, they made a decision to proceed with hospice and comfort care.   Hospice called.   daughter wants hospice in [place].

## 2021-04-21 VITALS
TEMPERATURE: 97 F | DIASTOLIC BLOOD PRESSURE: 74 MMHG | HEART RATE: 91 BPM | SYSTOLIC BLOOD PRESSURE: 144 MMHG | RESPIRATION RATE: 18 BRPM

## 2021-04-21 LAB — SARS-COV-2 RNA SPEC QL NAA+PROBE: SIGNIFICANT CHANGE UP

## 2021-04-21 PROCEDURE — 99232 SBSQ HOSP IP/OBS MODERATE 35: CPT

## 2021-04-21 RX ORDER — MORPHINE SULFATE 50 MG/1
0.25 CAPSULE, EXTENDED RELEASE ORAL
Qty: 0 | Refills: 0 | DISCHARGE
Start: 2021-04-21

## 2021-04-21 RX ORDER — SCOPALAMINE 1 MG/3D
1 PATCH, EXTENDED RELEASE TRANSDERMAL
Qty: 0 | Refills: 0 | DISCHARGE
Start: 2021-04-21

## 2021-04-21 RX ADMIN — CARVEDILOL PHOSPHATE 3.12 MILLIGRAM(S): 80 CAPSULE, EXTENDED RELEASE ORAL at 06:12

## 2021-04-21 RX ADMIN — SCOPALAMINE 1 PATCH: 1 PATCH, EXTENDED RELEASE TRANSDERMAL at 06:14

## 2021-04-21 NOTE — DISCHARGE NOTE NURSING/CASE MANAGEMENT/SOCIAL WORK - PATIENT PORTAL LINK FT
You can access the FollowMyHealth Patient Portal offered by [facility] by registering at the following website: [email or web address]. By joining [facility]’s FollowMyHealth portal, you will also be able to view your health information using other applications (apps) compatible with our system.

## 2021-04-21 NOTE — HOSPICE CARE NOTE - CONVESATION DETAILS
Check in call placed to 4 A  who reports transport has been called in. 3 C notified to direct EMS to 4 A should they go to that floor in error. Hospice contact info provided and request made for  to notify hospice when patient is picked up.
Patient pending Rapid Covid Screen. Active order pending. Call placed to Dr aTy to expedite unsuccessful. Notified Charge RN  that patient requires negative Covid to be transferred to [facility] . Charge nurse reports that at times it is difficult to get task completed. Reinforced that this needs to be completed. 
Patient to be transferred to [facility] Wednesday 4/21/2021.  Rapid Covid Screen and D/C requested with Dr. Jarret Tay. Request for authorization of Rapid Covid Screen requested from Dr MIRIAM Rivers. Transport requested for 8:30 AM with BRISA Zaldivar 
Patient's family verbalizes agreement with transfer to [facility] for end of life care. Patient's daughter Yessica to work on obtaining funds for deposit as patient's insurance does not cover room and board. Per resident, patient is medically stable for D/C. Hospice to discuss further with CM when family is ready to proceed with D/C. 
Update received from BRISA Goddard who reports patient is anticipated for D/C tomorrow and patient's daughter verbalizes agreement with home hospice services. Patient presently remains acute and has midline in place for active IV hydration. Recommendation for Dr Carlton to discuss transition to CMO with patient's daughter Vidhya made by Hospice . Hospice to coordinate admission visit with family once goals of care are clear. 
Hospice Consult has been received and Chart reviewed -Call was made to daughter Yessica to discuss hospice services/ philosophy. No answer  Voice Mail was left with contact information.

## 2021-04-26 DIAGNOSIS — I63.9 CEREBRAL INFARCTION, UNSPECIFIED: ICD-10-CM

## 2021-04-26 DIAGNOSIS — R53.82 CHRONIC FATIGUE, UNSPECIFIED: ICD-10-CM

## 2021-04-26 DIAGNOSIS — R29.714 NIHSS SCORE 14: ICD-10-CM

## 2021-04-26 DIAGNOSIS — I50.22 CHRONIC SYSTOLIC (CONGESTIVE) HEART FAILURE: ICD-10-CM

## 2021-04-26 DIAGNOSIS — Z51.5 ENCOUNTER FOR PALLIATIVE CARE: ICD-10-CM

## 2021-04-26 DIAGNOSIS — G81.94 HEMIPLEGIA, UNSPECIFIED AFFECTING LEFT NONDOMINANT SIDE: ICD-10-CM

## 2021-04-26 DIAGNOSIS — R40.2412 GLASGOW COMA SCALE SCORE 13-15, AT ARRIVAL TO EMERGENCY DEPARTMENT: ICD-10-CM

## 2021-04-26 DIAGNOSIS — I25.5 ISCHEMIC CARDIOMYOPATHY: ICD-10-CM

## 2021-04-26 DIAGNOSIS — Z66 DO NOT RESUSCITATE: ICD-10-CM

## 2021-04-26 DIAGNOSIS — I16.1 HYPERTENSIVE EMERGENCY: ICD-10-CM

## 2021-04-26 DIAGNOSIS — I31.3 PERICARDIAL EFFUSION (NONINFLAMMATORY): ICD-10-CM

## 2021-04-26 DIAGNOSIS — I21.4 NON-ST ELEVATION (NSTEMI) MYOCARDIAL INFARCTION: ICD-10-CM

## 2021-09-23 NOTE — OCCUPATIONAL THERAPY INITIAL EVALUATION ADULT - PERSONAL SAFETY AND JUDGMENT, REHAB EVAL
[FreeTextEntry1] : 4/92 left modified radical mastectomy without reconstruction\par Moderately differentiated invasive ductal carcinoma, 0/22 nodes\par Chemotherapy and 5 years of tamoxifen\par Sister with breast cancer at age 64; gene tested 2015 -\par \par Patient denies any breast masses or bone pain. Patient has never taken hormone replacement therapy
impaired

## 2022-04-19 NOTE — H&P ADULT - NSICDXNOPASTMEDICALHX_GEN_ALL_CORE
Negative urine culture  Continue plan of care      Message sent to patient via [facility] patient portal  <-- Click to add NO pertinent Past Medical History

## 2022-10-27 NOTE — DISCHARGE NOTE PROVIDER - NSDCQMMRS_NEU_ALL_CORE
Lucy Gomez is a 48 y.o. female who was seen by synchronous (real-time) audio-video technology on 10/27/2022 for Diabetes and Hypertension    Doxy link sent x2 without response. Assessment & Plan:       Subjective:       9/27/2022 virtual visit  Lucy Gomez is a 48 y.o. female who was seen by synchronous (real-time) audio-video technology on 9/27/2022 for Cholesterol Problem, Diabetes, and Hypertension    Assessment & Plan:   Diagnoses and all orders for this visit:    1. Type 2 diabetes mellitus with hyperglycemia, without long-term current use of insulin ([facility] [address]    2. Essential hypertension  -     amLODIPine (NORVASC) 10 mg tablet; Take 1 Tablet by mouth daily. -     losartan-hydroCHLOROthiazide (HYZAAR) 100-12.5 mg per tablet; TAKE 1 TABLET BY MOUTH EVERY DAY    Other orders  -     liraglutide (VICTOZA) 0.6 mg/0.1 mL (18 mg/3 mL) pnij; 0.6 mg daily for 7 days, THEN 1.2 mg daily for 7 days, THEN 1.8 mg daily for 30 days. -     Insulin Needles, Disposable, (Comfort EZ Pen Needles) 32 gauge x 5/32\" ndle; To use with victoza    Begin victoza as above for DM    Follow-up and Dispositions    Return in about 4 weeks (around 10/25/2022) for DM/HTN, virtual follow up. Subjective:   Prediabetic Review of Systems - medication compliance: not taking any medication currently, diet compliance: compliant most of the time, home glucose monitoring: is not performed, further ROS: no polyuria or polydipsia, no chest pain, dyspnea or TIA's, no numbness, tingling or pain in extremities  Patient states she has changed her eating habits and not eating sweats, decreased her carbohydrate intake. Reports she hasn't been able to continue to follow up with medical weight loss provider but she does continue with the lifestyle changes she learned. She has been a lot of cheese and cream cheese . Denies increased stress.   Patient hasn't been taking glipizide and also wasn't able to get [facility] since the beginning for the year due to insurance no longer paying. Stopped glypizide when she began wevogy    Hypertension ROS: taking medications as instructed, no medication side effects noted, no TIA's, no chest pain on exertion, no dyspnea on exertion, no swelling of ankles. She has not been monitoring her blood pressure recently. Prior to Admission medications    Medication Sig Start Date End Date Taking? Authorizing Provider   amLODIPine (NORVASC) 10 mg tablet Take 1 Tablet by mouth daily. 9/27/22  Yes Erin Canas, NP   losartan-hydroCHLOROthiazide (HYZAAR) 100-12.5 mg per tablet TAKE 1 TABLET BY MOUTH EVERY DAY 9/27/22  Yes Erin Stephaneable, NP   Insulin Needles, Disposable, (Comfort EZ Pen Needles) 32 gauge x 5/32\" ndle To use with victoza 9/27/22  Yes Erin Canas, NP   glipiZIDE (GLUCOTROL) 10 mg tablet Take 1 Tablet by mouth two (2) times a day. 11/30/21  Yes Erin Canas NP   Blood-Glucose Meter monitoring kit Dispense brand covered by insurance. Check glucose 1 time daily 11/30/21  Yes Erin Calderónable, NP   glucose blood VI test strips (ASCENSIA AUTODISC VI, ONE TOUCH ULTRA TEST VI) strip Dispense brand covered by insurance. Check glucose 1 time daily  Patient taking differently: Dispense brand covered by insurance. Check glucose 1 time daily 11/30/21  Yes Erin Calderónable, NP   Lancing Device with Lancets (One Touch Jose Antonio Bold) kit Dispense brand covered by insurance. Check blood glucose 2 times per day 11/30/21  Yes Erin Canas, NP   lancets (One Touch Jose Antonio Bold) 33 gauge misc Dispense brand covered by insurance. Check glucose 2 times per day 11/30/21  Yes Erin Canas, NP   atorvastatin (LIPITOR) 40 mg tablet Take 1 Tablet by mouth daily. 7/27/21  Yes Erin Estimable, NP   glucose blood VI test strips (ONETOUCH ULTRA TEST) strip Use daily as directed 2/17/20  Yes Earnest Sargent NP   ergocalciferol (ERGOCALCIFEROL) 50,000 unit capsule Take 1 Cap by mouth every seven (7) days.  3/9/17  Yes Trisha Fulton, DO   liraglutide (VICTOZA) 0.6 mg/0.1 mL (18 mg/3 mL) pnij 0.6 mg daily for 7 days, THEN 1.2 mg daily for 7 days, THEN 1.8 mg daily for 30 days. Patient not taking: Reported on 10/27/2022 9/27/22 11/10/22  Beverlee Smoker T, NP   diclofenac (VOLTAREN) 1 % gel Apply  to affected area four (4) times daily. Patient not taking: No sig reported 8/23/21   Owen Castillo MD     Patient Active Problem List   Diagnosis Code    Hypertension I10    Hypercholesterolemia E78.00    Vitamin D deficiency E55.9    Type 2 diabetes mellitus with hyperglycemia, without long-term current use of insulin ([facility] [address] E11.65    Hypokalemia E87.6    Encounter for long-term (current) use of other medications Z79.899    Hypomagnesemia W44.16    Umbilical hernia H04.8    Non compliance w medication regimen Z91.14    Morbid obesity with BMI of 40.0-44.9, adult ([facility] E66.01, Z68.41     Patient Active Problem List    Diagnosis Date Noted    Morbid obesity with BMI of 40.0-44.9, adult ([facility] [address] 08/17/2016    Non compliance w medication regimen 15/41/9546    Umbilical hernia 72/49/3439    Hypomagnesemia 07/25/2014    Encounter for long-term (current) use of other medications 02/25/2014    Hypokalemia 01/21/2013    Vitamin D deficiency 05/15/2012    Type 2 diabetes mellitus with hyperglycemia, without long-term current use of insulin ([facility] [address] 05/15/2012    Hypertension     Hypercholesterolemia      Current Outpatient Medications   Medication Sig Dispense Refill    amLODIPine (NORVASC) 10 mg tablet Take 1 Tablet by mouth daily. 90 Tablet 1    losartan-hydroCHLOROthiazide (HYZAAR) 100-12.5 mg per tablet TAKE 1 TABLET BY MOUTH EVERY DAY 90 Tablet 1    Insulin Needles, Disposable, (Comfort EZ Pen Needles) 32 gauge x 5/32\" ndle To use with victoza 30 Pen Needle 2    glipiZIDE (GLUCOTROL) 10 mg tablet Take 1 Tablet by mouth two (2) times a day. 180 Tablet 1    Blood-Glucose Meter monitoring kit Dispense brand covered by insurance.  Check glucose 1 time daily 1 Kit 0    glucose blood VI test strips (ASCENSIA AUTODISC VI, ONE TOUCH ULTRA TEST VI) strip Dispense brand covered by insurance. Check glucose 1 time daily (Patient taking differently: Dispense brand covered by insurance. Check glucose 1 time daily) 100 Strip 2    Lancing Device with Lancets (One Touch Delica) kit Dispense brand covered by insurance. Check blood glucose 2 times per day 1 Kit 0    lancets (One Touch Delica) 33 gauge misc Dispense brand covered by insurance. Check glucose 2 times per day 100 Lancet 2    atorvastatin (LIPITOR) 40 mg tablet Take 1 Tablet by mouth daily. 90 Tablet 1    glucose blood VI test strips (ONETOUCH ULTRA TEST) strip Use daily as directed 120 Strip 5    ergocalciferol (ERGOCALCIFEROL) 50,000 unit capsule Take 1 Cap by mouth every seven (7) days. 8 Cap 5    liraglutide (VICTOZA) 0.6 mg/0.1 mL (18 mg/3 mL) pnij 0.6 mg daily for 7 days, THEN 1.2 mg daily for 7 days, THEN 1.8 mg daily for 30 days. (Patient not taking: Reported on 10/27/2022) 3 mL 0    diclofenac (VOLTAREN) 1 % gel Apply  to affected area four (4) times daily.  (Patient not taking: No sig reported) 150 g 0     No Known Allergies  Past Medical History:   Diagnosis Date    Asthma     Hypercholesterolemia     Hyperglycemia     Hypertension     Hypokalemia 2013    Hypomagnesemia 2014    Hypovitaminosis D 5/15/2012    NIDDM (non-insulin dependent diabetes mellitus) 5/15/2012     Past Surgical History:   Procedure Laterality Date    HX  SECTION      HX TUBAL LIGATION       Family History   Problem Relation Age of Onset    Hypertension Mother     Diabetes Mother     Kidney Disease Mother     Hypertension Father     Diabetes Father     Diabetes Maternal Aunt     Cancer Maternal Grandfather     Cancer Paternal Grandfather      Social History     Tobacco Use    Smoking status: Never    Smokeless tobacco: Never   Substance Use Topics    Alcohol use: No       ROS    Objective:     Patient-Reported Vitals 2022 Patient-Reported Weight 238 lbs      General: alert, cooperative, no distress   Mental  status: normal mood, behavior, speech, dress, motor activity, and thought processes, able to follow commands   HENT: NCAT   Neck: no visualized mass   Resp: no respiratory distress   Neuro: no gross deficits   Skin: no discoloration or lesions of concern on visible areas   Psychiatric: normal affect, consistent with stated mood, no evidence of hallucinations     Additional exam findings: We discussed the expected course, resolution and complications of the diagnosis(es) in detail. Medication risks, benefits, costs, interactions, and alternatives were discussed as indicated. I advised her to contact the office if her condition worsens, changes or fails to improve as anticipated. She expressed understanding with the diagnosis(es) and plan. Martin Oshea, was evaluated through a synchronous (real-time) audio-video encounter. The patient (or guardian if applicable) is aware that this is a billable service, which includes applicable co-pays. This Virtual Visit was conducted with patient's (and/or legal guardian's) consent. The visit was conducted pursuant to the emergency declaration under the [address] [place] [address], [address] [place] [address] [facility] authority and the [employer] and [place] Act. Patient identification was verified, and a caregiver was present when appropriate. The patient was located at: Home:   The provider was located at:  Facility (Appt Department):         Alejandro Galan NP 5 - Severe disability.  Requires constant nursing care and attention, bedridden, incontinent.

## 2023-02-06 NOTE — PATIENT PROFILE ADULT - LIVING ENVIRONMENT
----- Message from Shawn Main NP sent at 2/6/2023  8:46 AM EST -----  Kidney fxn is up likely due to lasix 80 mg BID. Discussed with dr Elli Morales, cut back to 80 mg daily. Repeat BMP in 2 weeks. no

## 2023-09-11 NOTE — CONSULT NOTE ADULT - MOLST COMPLETED
13-Apr-2021 Airway patent, Nasal mucosa clear. Mouth with Moderate dry mucosa. Throat has no vesicles, no oropharyngeal exudates and uvula is midline.

## 2024-04-08 NOTE — PATIENT PROFILE ADULT - DOES PATIENT HAVE ADVANCE DIRECTIVE
Returned patients call, per Dr. Joshua go to [place] lab for urine sample and we will call with results, patient agreeable. Also scheduled for due appointment.    Yes
